# Patient Record
Sex: FEMALE | Race: WHITE | NOT HISPANIC OR LATINO | URBAN - METROPOLITAN AREA
[De-identification: names, ages, dates, MRNs, and addresses within clinical notes are randomized per-mention and may not be internally consistent; named-entity substitution may affect disease eponyms.]

---

## 2019-05-17 ENCOUNTER — INPATIENT (INPATIENT)
Facility: HOSPITAL | Age: 74
LOS: 11 days | Discharge: HOME | DRG: 481 | End: 2019-05-29
Attending: ORTHOPAEDIC SURGERY | Admitting: ORTHOPAEDIC SURGERY
Payer: COMMERCIAL

## 2019-05-17 VITALS
RESPIRATION RATE: 17 BRPM | OXYGEN SATURATION: 99 % | HEART RATE: 86 BPM | TEMPERATURE: 100 F | DIASTOLIC BLOOD PRESSURE: 105 MMHG | SYSTOLIC BLOOD PRESSURE: 173 MMHG

## 2019-05-17 DIAGNOSIS — M25.561 PAIN IN RIGHT KNEE: ICD-10-CM

## 2019-05-17 DIAGNOSIS — Z96.653 PRESENCE OF ARTIFICIAL KNEE JOINT, BILATERAL: Chronic | ICD-10-CM

## 2019-05-17 LAB
ALBUMIN SERPL ELPH-MCNC: 3.6 G/DL — SIGNIFICANT CHANGE UP (ref 3.3–5)
ALP SERPL-CCNC: 118 U/L — SIGNIFICANT CHANGE UP (ref 40–120)
ALT FLD-CCNC: 17 U/L — SIGNIFICANT CHANGE UP (ref 10–45)
ANION GAP SERPL CALC-SCNC: 11 MMOL/L — SIGNIFICANT CHANGE UP (ref 5–17)
APPEARANCE UR: CLEAR — SIGNIFICANT CHANGE UP
APTT BLD: 30.5 SEC — SIGNIFICANT CHANGE UP (ref 27.5–36.3)
AST SERPL-CCNC: 23 U/L — SIGNIFICANT CHANGE UP (ref 10–40)
BILIRUB SERPL-MCNC: 0.4 MG/DL — SIGNIFICANT CHANGE UP (ref 0.2–1.2)
BILIRUB UR-MCNC: NEGATIVE — SIGNIFICANT CHANGE UP
BLD GP AB SCN SERPL QL: NEGATIVE — SIGNIFICANT CHANGE UP
BUN SERPL-MCNC: 22 MG/DL — SIGNIFICANT CHANGE UP (ref 7–23)
CALCIUM SERPL-MCNC: 9.4 MG/DL — SIGNIFICANT CHANGE UP (ref 8.4–10.5)
CHLORIDE SERPL-SCNC: 106 MMOL/L — SIGNIFICANT CHANGE UP (ref 96–108)
CO2 SERPL-SCNC: 22 MMOL/L — SIGNIFICANT CHANGE UP (ref 22–31)
COLOR SPEC: YELLOW — SIGNIFICANT CHANGE UP
CREAT SERPL-MCNC: 0.64 MG/DL — SIGNIFICANT CHANGE UP (ref 0.5–1.3)
DIFF PNL FLD: NEGATIVE — SIGNIFICANT CHANGE UP
GLUCOSE SERPL-MCNC: 107 MG/DL — HIGH (ref 70–99)
GLUCOSE UR QL: NEGATIVE — SIGNIFICANT CHANGE UP
HCT VFR BLD CALC: 38.2 % — SIGNIFICANT CHANGE UP (ref 34.5–45)
HGB BLD-MCNC: 11.6 G/DL — SIGNIFICANT CHANGE UP (ref 11.5–15.5)
INR BLD: 0.96 — SIGNIFICANT CHANGE UP (ref 0.88–1.16)
KETONES UR-MCNC: ABNORMAL MG/DL
LEUKOCYTE ESTERASE UR-ACNC: NEGATIVE — SIGNIFICANT CHANGE UP
MCHC RBC-ENTMCNC: 25.3 PG — LOW (ref 27–34)
MCHC RBC-ENTMCNC: 30.4 GM/DL — LOW (ref 32–36)
MCV RBC AUTO: 83.2 FL — SIGNIFICANT CHANGE UP (ref 80–100)
NITRITE UR-MCNC: NEGATIVE — SIGNIFICANT CHANGE UP
NRBC # BLD: 0 /100 WBCS — SIGNIFICANT CHANGE UP (ref 0–0)
PH UR: 5.5 — SIGNIFICANT CHANGE UP (ref 5–8)
PLATELET # BLD AUTO: 223 K/UL — SIGNIFICANT CHANGE UP (ref 150–400)
POTASSIUM SERPL-MCNC: 4.6 MMOL/L — SIGNIFICANT CHANGE UP (ref 3.5–5.3)
POTASSIUM SERPL-SCNC: 4.6 MMOL/L — SIGNIFICANT CHANGE UP (ref 3.5–5.3)
PROT SERPL-MCNC: 6.8 G/DL — SIGNIFICANT CHANGE UP (ref 6–8.3)
PROT UR-MCNC: NEGATIVE MG/DL — SIGNIFICANT CHANGE UP
PROTHROM AB SERPL-ACNC: 10.8 SEC — SIGNIFICANT CHANGE UP (ref 10–12.9)
RBC # BLD: 4.59 M/UL — SIGNIFICANT CHANGE UP (ref 3.8–5.2)
RBC # FLD: 14.4 % — SIGNIFICANT CHANGE UP (ref 10.3–14.5)
RH IG SCN BLD-IMP: POSITIVE — SIGNIFICANT CHANGE UP
SODIUM SERPL-SCNC: 139 MMOL/L — SIGNIFICANT CHANGE UP (ref 135–145)
SP GR SPEC: 1.02 — SIGNIFICANT CHANGE UP (ref 1–1.03)
UROBILINOGEN FLD QL: 0.2 E.U./DL — SIGNIFICANT CHANGE UP
WBC # BLD: 5.49 K/UL — SIGNIFICANT CHANGE UP (ref 3.8–10.5)
WBC # FLD AUTO: 5.49 K/UL — SIGNIFICANT CHANGE UP (ref 3.8–10.5)

## 2019-05-17 PROCEDURE — 73700 CT LOWER EXTREMITY W/O DYE: CPT | Mod: 26,RT,76

## 2019-05-17 PROCEDURE — 99223 1ST HOSP IP/OBS HIGH 75: CPT

## 2019-05-17 PROCEDURE — 99223 1ST HOSP IP/OBS HIGH 75: CPT | Mod: GC

## 2019-05-17 PROCEDURE — 73552 X-RAY EXAM OF FEMUR 2/>: CPT | Mod: 26,RT

## 2019-05-17 PROCEDURE — 93010 ELECTROCARDIOGRAM REPORT: CPT | Mod: NC

## 2019-05-17 PROCEDURE — 71045 X-RAY EXAM CHEST 1 VIEW: CPT | Mod: 26

## 2019-05-17 PROCEDURE — 99285 EMERGENCY DEPT VISIT HI MDM: CPT | Mod: 25

## 2019-05-17 PROCEDURE — 73560 X-RAY EXAM OF KNEE 1 OR 2: CPT | Mod: 26,RT

## 2019-05-17 RX ORDER — HYDROMORPHONE HYDROCHLORIDE 2 MG/ML
0.5 INJECTION INTRAMUSCULAR; INTRAVENOUS; SUBCUTANEOUS EVERY 4 HOURS
Refills: 0 | Status: DISCONTINUED | OUTPATIENT
Start: 2019-05-17 | End: 2019-05-19

## 2019-05-17 RX ORDER — MAGNESIUM HYDROXIDE 400 MG/1
30 TABLET, CHEWABLE ORAL DAILY
Refills: 0 | Status: DISCONTINUED | OUTPATIENT
Start: 2019-05-17 | End: 2019-05-29

## 2019-05-17 RX ORDER — HEPARIN SODIUM 5000 [USP'U]/ML
5000 INJECTION INTRAVENOUS; SUBCUTANEOUS EVERY 8 HOURS
Refills: 0 | Status: DISCONTINUED | OUTPATIENT
Start: 2019-05-17 | End: 2019-05-18

## 2019-05-17 RX ORDER — OXYCODONE HYDROCHLORIDE 5 MG/1
5 TABLET ORAL EVERY 4 HOURS
Refills: 0 | Status: DISCONTINUED | OUTPATIENT
Start: 2019-05-17 | End: 2019-05-21

## 2019-05-17 RX ORDER — ACETAMINOPHEN 500 MG
1000 TABLET ORAL ONCE
Refills: 0 | Status: COMPLETED | OUTPATIENT
Start: 2019-05-17 | End: 2019-05-17

## 2019-05-17 RX ORDER — SODIUM CHLORIDE 9 MG/ML
1000 INJECTION, SOLUTION INTRAVENOUS
Refills: 0 | Status: DISCONTINUED | OUTPATIENT
Start: 2019-05-17 | End: 2019-05-18

## 2019-05-17 RX ORDER — POLYETHYLENE GLYCOL 3350 17 G/17G
17 POWDER, FOR SOLUTION ORAL DAILY
Refills: 0 | Status: DISCONTINUED | OUTPATIENT
Start: 2019-05-17 | End: 2019-05-29

## 2019-05-17 RX ORDER — OXYCODONE HYDROCHLORIDE 5 MG/1
10 TABLET ORAL EVERY 4 HOURS
Refills: 0 | Status: DISCONTINUED | OUTPATIENT
Start: 2019-05-17 | End: 2019-05-21

## 2019-05-17 RX ORDER — LANOLIN ALCOHOL/MO/W.PET/CERES
3 CREAM (GRAM) TOPICAL AT BEDTIME
Refills: 0 | Status: DISCONTINUED | OUTPATIENT
Start: 2019-05-17 | End: 2019-05-29

## 2019-05-17 RX ORDER — SENNA PLUS 8.6 MG/1
2 TABLET ORAL AT BEDTIME
Refills: 0 | Status: DISCONTINUED | OUTPATIENT
Start: 2019-05-17 | End: 2019-05-29

## 2019-05-17 RX ORDER — ACETAMINOPHEN 500 MG
650 TABLET ORAL EVERY 4 HOURS
Refills: 0 | Status: DISCONTINUED | OUTPATIENT
Start: 2019-05-17 | End: 2019-05-29

## 2019-05-17 RX ORDER — DOCUSATE SODIUM 100 MG
100 CAPSULE ORAL THREE TIMES A DAY
Refills: 0 | Status: DISCONTINUED | OUTPATIENT
Start: 2019-05-17 | End: 2019-05-29

## 2019-05-17 RX ORDER — ONDANSETRON 8 MG/1
4 TABLET, FILM COATED ORAL EVERY 6 HOURS
Refills: 0 | Status: DISCONTINUED | OUTPATIENT
Start: 2019-05-17 | End: 2019-05-29

## 2019-05-17 RX ADMIN — HEPARIN SODIUM 5000 UNIT(S): 5000 INJECTION INTRAVENOUS; SUBCUTANEOUS at 23:56

## 2019-05-17 RX ADMIN — Medication 400 MILLIGRAM(S): at 19:03

## 2019-05-17 RX ADMIN — SODIUM CHLORIDE 120 MILLILITER(S): 9 INJECTION, SOLUTION INTRAVENOUS at 23:55

## 2019-05-17 RX ADMIN — Medication 1000 MILLIGRAM(S): at 21:09

## 2019-05-17 RX ADMIN — Medication 3 MILLIGRAM(S): at 23:56

## 2019-05-17 NOTE — ED ADULT NURSE REASSESSMENT NOTE - NS ED NURSE REASSESS COMMENT FT1
17F Osullivan catheter inserted. Pt denies any pain or discomfort during and after insertion. Pt tolerated procedure. Noted clear yellow urine draining in drainage bag ~ 200mL output. Will continue to monitor.

## 2019-05-17 NOTE — H&P ADULT - NSHPSOCIALHISTORY_GEN_ALL_CORE
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 Denies cigs, EtOH and illicit drug use

## 2019-05-17 NOTE — ED PROVIDER NOTE - PHYSICAL EXAMINATION
VITAL SIGNS: I have reviewed nursing notes and confirm.  CONSTITUTIONAL: Well-developed; well-nourished; in minimal distress.  SKIN: Skin is warm and dry, no acute rash.  HEAD: Normocephalic; atraumatic.  EYES:  EOM intact; conjunctiva and sclera clear.  ENT: No nasal discharge; airway clear.  NECK: Supple; Voluntary FROM  CARD: No rubs appreciated, Regular rate and rhythm.  RESP: No wheezes, no rales. No respiratory distress  ABD: Soft; non-distended; non-tender; no rebound or guarding  EXT: Normal ROM. No cyanosis or edema.  RLE: neurovascularly intact, no ttp at ankle/hip, knee ttp, may ROM, externally rotated.   NEURO: Alert, oriented. Grossly unremarkable.  PSYCH: Cooperative, appropriate.

## 2019-05-17 NOTE — ED PROVIDER NOTE - CLINICAL SUMMARY MEDICAL DECISION MAKING FREE TEXT BOX
73F pmh htn (not on meds), s/p juan luis and gastric bypass, prior K knee sx p/w slip & fall down 2 stairs w R knee pain & immobility. No prior episodes. Unable to bear weight. No popping/tearing. Exam neurovascular intact. Differential diagnosis includes but is not limited to fracture, dislocation,  contusion, MSK etiology. 73F pmh htn (not on meds), s/p juan luis and gastric bypass, prior K knee sx p/w slip & fall down 2 stairs w R knee pain & immobility. No prior episodes. Unable to bear weight. No popping/tearing. Exam neurovascular intact. Differential diagnosis includes but is not limited to fracture, dislocation,  contusion, MSK etiology. Comminuted R distal femur fx. Pt states no pain w/o movement, advised findings, need for sx. 73F pmh htn (not on meds), s/p juan luis and gastric bypass, prior K knee sx p/w slip & fall down 2 stairs w R knee pain & immobility. No prior episodes. Unable to bear weight. No popping/tearing. Exam neurovascular intact. Differential diagnosis includes but is not limited to fracture, dislocation,  contusion, MSK etiology. Comminuted R distal femur fx. Pt states no pain w/o movement, advised findings, need for sx. CT requested by ortho for op planning.

## 2019-05-17 NOTE — CONSULT NOTE ADULT - SUBJECTIVE AND OBJECTIVE BOX
HPI: 73F PMH HTN (no longer on medications, well-controlled), h/o gastric bypass surgery 15 yrs ago, b/l knee replacement (5 yrs ago for R knee and 1 yr ago for L knee) presenting after a fall, admitted for R distal femur periprosthetic fx repair. 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 This AM, pt had another fall and hit her R knee but had difficulty mobilizing her legs. Denies f/c, weight changes, cp, sob, abdominal pain, n/v/d/c, dysuria, edema, rash.    Pt is able to walk 4-6 miles in a day without any dyspnea. Pt last had a pharmacological stress test 5 yrs ago that was negative. No MI or cardiac events in the past.      PAST MEDICAL/SURGICAL HISTORY  PAST MEDICAL & SURGICAL HISTORY:  Mild hypertension  S/P total knee arthroplasty, bilateral  gastric bypass  appendectomy 1971  cholecystectomy 50 yrs ago    FAMILY HISTORY:  Father - HLD, endarterectomy, knee replacement  Mother - arthritis, thyroid disease    ALLERGY:  sulfa drugs - fever and malaise    MEDICATIONS:  melatonin  fish oil  CoQ 10  psyllium  biotin  Vitamins s/p gastric bypass    T(C): 37.4 (05-17-19 @ 19:46), Max: 37.8 (05-17-19 @ 16:46)  HR: 84 (05-17-19 @ 19:46) (84 - 86)  BP: 106/65 (05-17-19 @ 19:46) (106/65 - 173/105)  RR: 17 (05-17-19 @ 19:46) (17 - 17)  SpO2: 98% (05-17-19 @ 19:46) (98% - 99%)  Wt(kg): --Vital Signs Last 24 Hrs  T(C): 37.4 (17 May 2019 19:46), Max: 37.8 (17 May 2019 16:46)  T(F): 99.4 (17 May 2019 19:46), Max: 100.1 (17 May 2019 16:46)  HR: 84 (17 May 2019 19:46) (84 - 86)  BP: 106/65 (17 May 2019 19:46) (106/65 - 173/105)  BP(mean): --  RR: 17 (17 May 2019 19:46) (17 - 17)  SpO2: 98% (17 May 2019 19:46) (98% - 99%)    PHYSICAL EXAM:  GENERAL: pleasant female, obese, in NAD  HEAD:  Atraumatic, Normocephalic  EYES: EOMI, PERRLA, conjunctiva and sclera clear  ENMT: No tonsillar erythema, exudates, or enlargement; Moist mucous membranes, Good dentition, No lesions  NECK: Supple  NERVOUS SYSTEM:  Alert & Oriented X3, Good concentration  CHEST/LUNG: Clear to ascultation bilaterally; No rales, rhonchi, wheezing, or rubs  HEART: Regular rate and rhythm; No murmurs, rubs, or gallops  ABDOMEN: Soft, Nontender, Nondistended; Bowel sounds present  EXTREMITIES:  2+ Peripheral Pulses, No clubbing, cyanosis. R knee tender to palpation with swollen warm joint; nonerythematous  LYMPH: No lymphadenopathy noted  SKIN: No rashes or lesions    Consultant(s) Notes Reviewed:  [x ] YES  [ ] NO  Care Discussed with Consultants/Other Providers [ ] YES  [ ] NO    LABS:  CBC   05-17-19 @ 17:44  Hematocrit 38.2  Hemoglobin 11.6  Mean Cell Hemoglobin 25.3  Platelet Count-Automated 223  RBC Count 4.59  Red Cell Distrib Width 14.4  Wbc Count 5.49      BMP  05-17-19 @ 17:44  Anion Gap. Serum 11  Blood Urea Nitrogen,Serm 22  Calcium, Total Serum 9.4  Carbon Dioxide, Serum 22  Chloride, Serum 106  Creatinine, Serum 0.64  eGFR in  103  eGFR in Non Afican American 89  Gloucose, serum 107  Potassium, Serum 4.6  Sodium, Serum 139                  CMP  05-17-19 @ 17:44  Jie Aminotransferase(ALT/SGPT)17  Albumin, Serum 3.6  Alkaline Phosphatase, Serum 118  Anion Gap, Serum 11  Aspartate Aminotransferase (AST/SGOT)23  Bilirubin Total, Serum 0.4  Blood Urea Nitrogen, Serum 22  Calcium,Total Serum 9.4  Carbon Dioxide, Serum 22  Chloride, Serum 106  Creatinine, Serum 0.64  eGFR if  103  eGFR if Non African American 89  Glucose, Serum 107  Potassium, Serum 4.6  Protein Total, Serum 6.8  Sodium, Serum 139                          PT/INR  PT/INR  05-17-19 @ 17:44  INR 0.96  Prothrombin Time Comment --  Prothrobin Time, Meaqgy03.8                  RADIOLOGY & ADDITIONAL TESTS: none

## 2019-05-17 NOTE — H&P ADULT - NSHPPHYSICALEXAM_GEN_ALL_CORE
Gen: Obese, awake, alert and oriented, NAD  MSK:   R knee with well-healed scar, bruise but no open wounds or erythema  TTP around knee  Sensation intact s/s/sp/dp/t   firing EHL/FHL/GS/TA  pulses 2+ DP and PT  LUCRETIA R 1.09 and L 1  toes warm and well perfused  + log roll

## 2019-05-17 NOTE — H&P ADULT - HISTORY OF PRESENT ILLNESS
73F hx bilateral TKA at [facility] and previous hx HTN. Was on losartan but no longer on it, presenting to ED s/p mechanical fall down 2 steps onto her right knee with right knee pain. Small knee bruise but no open wounds or erythema. No other injuries. No head trauma. No LOC. Previous fall a day prior to same knee but did not seek medical care. Not on anticoagulation. Ambulates about 6 miles daily without assistive devices. Denies fever, chills, nausea, vomiting, SOB, numbness or tingling.

## 2019-05-17 NOTE — ED PROVIDER NOTE - OBJECTIVE STATEMENT
73F pmh htn (not on meds), s/p juan luis and gastric bypass, prior K knee sx p/w slip & fall down 2 stairs w R knee pain & immobility. No prior episodes. Unable to bear weight. No popping/tearing.    no prior CAD, stents

## 2019-05-17 NOTE — CONSULT NOTE ADULT - PROBLEM SELECTOR PROBLEM 4
Obesity, unspecified classification, unspecified obesity type, unspecified whether serious comorbidity present

## 2019-05-17 NOTE — H&P ADULT - ASSESSMENT
73F hx bilateral TKA s/p mechanical fall with R comminuted distal femur periprosthetic fracture      - admit to Ortho  - placed in KI  - NWB RLE  - zayas  - DVT: SQH, SCDs  - preop clearance   - booked and consented   - preop labs  - OR tomorrow ORIF v retrograde nail

## 2019-05-17 NOTE — H&P ADULT - NSHPLABSRESULTS_GEN_ALL_CORE
CT prelim:   FINDINGS: Patient is status post bilateral knee arthroplasties. There is an   acute comminuted fracture of the distal femoral shaft. The distal fracture   fragment is posteriorly displaced. Artifact from the arthroplasty hardware   limits detailed evaluation but there is soft tissue swelling of the   surrounding area. Tibial hardware is intact.     IMPRESSION:   Acute comminuted fracture of the distal femoral shaft. CT prelim:   FINDINGS: Patient is status post bilateral knee arthroplasties. There is an   acute comminuted fracture of the distal femoral shaft. The distal fracture   fragment is posteriorly displaced. Artifact from the arthroplasty hardware   limits detailed evaluation but there is soft tissue swelling of the   surrounding area. Tibial hardware is intact.     IMPRESSION:   Acute comminuted fracture of the distal femur. No evidence of prosthesis loosening

## 2019-05-17 NOTE — H&P ADULT - ATTENDING COMMENTS
Patient seen examined and history reviewed with patient, family, and resident in morning of Saturday May 18th. Subsequently, I discussed the patients care with the patients primary joints orthopedic surgeon, Dr. Isaac Segura at [facility] ([place]). We discussed the patients plan for retrograde intramedullary nailing and the size of the components were confirmed to ensure appropriate instrumentation available for the case. The patient has a stable appearing  right total knee arthroplasty with an LPS nexgen Breann total knee with associated distal femur periarticular fracture. She has been cleared by medicine. I explained at length to the patient and the family the risks, benefits and alternatives to operative treatment. Depending on the stability of the construct and her bone quality will determine her weight bearing post operatively. In addition, we discussed her eventual transition back to [place] and need for anticoagulation. She will likely be placed on eliquis 2.5mg BID for the trip home. She will be following up with Dr. Segura which we will continue to coordinate post operatively.   The hardware for the case has been called in. If it arrives in time, we will proceed to surgery today (5/18), otherwise we will plan for first thing 5/19.  Over 1 hour was spent coordinating and discussing this patients case.

## 2019-05-17 NOTE — ED ADULT NURSE NOTE - OBJECTIVE STATEMENT
Pt is a 74 y/o female who came in to ED for evaluation of right leg pain s/p fall today. Pt reports leg is externally rotated. Pt c/o pain only with movement.

## 2019-05-17 NOTE — CONSULT NOTE ADULT - ASSESSMENT
73F PMH HTN (no longer on medications, well-controlled), h/o gastric bypass surgery 15 yrs ago, b/l knee replacement (5 yrs ago for R knee and 1 yr ago for L knee) presenting after a fall, admitted for R distal femur periprosthetic fx repair.     RCRI risk score 0  METS > 4  EKG NSR with ?TWI V6    Pt is low risk for an intermediate risk procedure  She is medically optimized for femur fx repair tomorrow    INCOMPLETE 73F PMH HTN (no longer on medications, well-controlled), h/o gastric bypass surgery 15 yrs ago, b/l knee replacement (5 yrs ago for R knee and 1 yr ago for L knee) presenting after a fall, admitted for R distal femur periprosthetic fx repair.     RCRI risk score 0  METS > 4  EKG NSR with ?TWI V6    Pt is low risk for an intermediate risk procedure  She is medically optimized for femur fx repair tomorrow    Discussed with attending Dr. Blanchard

## 2019-05-17 NOTE — CONSULT NOTE ADULT - ATTENDING COMMENTS
patient seen and examined; reviewed labs, radiology imaging, EKG, above consult note    PE findings as above      1. R femur fx/ preop: medically optimized , no further testing, monitor BP jacqui and post op; medicine will follow         rest of plan as above

## 2019-05-18 DIAGNOSIS — S72.444A: ICD-10-CM

## 2019-05-18 DIAGNOSIS — I10 ESSENTIAL (PRIMARY) HYPERTENSION: ICD-10-CM

## 2019-05-18 DIAGNOSIS — E66.9 OBESITY, UNSPECIFIED: ICD-10-CM

## 2019-05-18 DIAGNOSIS — Z01.818 ENCOUNTER FOR OTHER PREPROCEDURAL EXAMINATION: ICD-10-CM

## 2019-05-18 LAB
ANION GAP SERPL CALC-SCNC: 10 MMOL/L — SIGNIFICANT CHANGE UP (ref 5–17)
BUN SERPL-MCNC: 21 MG/DL — SIGNIFICANT CHANGE UP (ref 7–23)
CALCIUM SERPL-MCNC: 9 MG/DL — SIGNIFICANT CHANGE UP (ref 8.4–10.5)
CHLORIDE SERPL-SCNC: 109 MMOL/L — HIGH (ref 96–108)
CO2 SERPL-SCNC: 24 MMOL/L — SIGNIFICANT CHANGE UP (ref 22–31)
CREAT SERPL-MCNC: 0.83 MG/DL — SIGNIFICANT CHANGE UP (ref 0.5–1.3)
GLUCOSE SERPL-MCNC: 123 MG/DL — HIGH (ref 70–99)
HCT VFR BLD CALC: 31 % — LOW (ref 34.5–45)
HCV AB S/CO SERPL IA: 0.14 S/CO — SIGNIFICANT CHANGE UP
HCV AB SERPL-IMP: SIGNIFICANT CHANGE UP
HGB BLD-MCNC: 9.6 G/DL — LOW (ref 11.5–15.5)
MCHC RBC-ENTMCNC: 25.7 PG — LOW (ref 27–34)
MCHC RBC-ENTMCNC: 31 GM/DL — LOW (ref 32–36)
MCV RBC AUTO: 82.9 FL — SIGNIFICANT CHANGE UP (ref 80–100)
MRSA PCR RESULT.: NEGATIVE — SIGNIFICANT CHANGE UP
NRBC # BLD: 0 /100 WBCS — SIGNIFICANT CHANGE UP (ref 0–0)
PLATELET # BLD AUTO: 197 K/UL — SIGNIFICANT CHANGE UP (ref 150–400)
POTASSIUM SERPL-MCNC: 4.8 MMOL/L — SIGNIFICANT CHANGE UP (ref 3.5–5.3)
POTASSIUM SERPL-SCNC: 4.8 MMOL/L — SIGNIFICANT CHANGE UP (ref 3.5–5.3)
RBC # BLD: 3.74 M/UL — LOW (ref 3.8–5.2)
RBC # FLD: 14.5 % — SIGNIFICANT CHANGE UP (ref 10.3–14.5)
S AUREUS DNA NOSE QL NAA+PROBE: POSITIVE
SODIUM SERPL-SCNC: 143 MMOL/L — SIGNIFICANT CHANGE UP (ref 135–145)
WBC # BLD: 11.83 K/UL — HIGH (ref 3.8–10.5)
WBC # FLD AUTO: 11.83 K/UL — HIGH (ref 3.8–10.5)

## 2019-05-18 PROCEDURE — 99233 SBSQ HOSP IP/OBS HIGH 50: CPT | Mod: GC

## 2019-05-18 RX ORDER — HEPARIN SODIUM 5000 [USP'U]/ML
5000 INJECTION INTRAVENOUS; SUBCUTANEOUS ONCE
Refills: 0 | Status: COMPLETED | OUTPATIENT
Start: 2019-05-18 | End: 2019-05-18

## 2019-05-18 RX ORDER — HEPARIN SODIUM 5000 [USP'U]/ML
7500 INJECTION INTRAVENOUS; SUBCUTANEOUS EVERY 8 HOURS
Refills: 0 | Status: DISCONTINUED | OUTPATIENT
Start: 2019-05-18 | End: 2019-05-19

## 2019-05-18 RX ORDER — SODIUM CHLORIDE 9 MG/ML
1000 INJECTION, SOLUTION INTRAVENOUS
Refills: 0 | Status: DISCONTINUED | OUTPATIENT
Start: 2019-05-18 | End: 2019-05-29

## 2019-05-18 RX ADMIN — HEPARIN SODIUM 5000 UNIT(S): 5000 INJECTION INTRAVENOUS; SUBCUTANEOUS at 14:30

## 2019-05-18 RX ADMIN — HEPARIN SODIUM 7500 UNIT(S): 5000 INJECTION INTRAVENOUS; SUBCUTANEOUS at 21:48

## 2019-05-18 RX ADMIN — OXYCODONE HYDROCHLORIDE 10 MILLIGRAM(S): 5 TABLET ORAL at 16:11

## 2019-05-18 RX ADMIN — Medication 650 MILLIGRAM(S): at 18:00

## 2019-05-18 RX ADMIN — Medication 650 MILLIGRAM(S): at 07:26

## 2019-05-18 RX ADMIN — Medication 650 MILLIGRAM(S): at 17:09

## 2019-05-18 RX ADMIN — Medication 650 MILLIGRAM(S): at 08:26

## 2019-05-18 RX ADMIN — Medication 3 MILLIGRAM(S): at 21:47

## 2019-05-18 RX ADMIN — OXYCODONE HYDROCHLORIDE 10 MILLIGRAM(S): 5 TABLET ORAL at 17:28

## 2019-05-18 NOTE — PRE-OP CHECKLIST - SELECT TESTS ORDERED
Type and Cross/EKG/CXR/Urinalysis/Type and Screen EKG/CXR/Urinalysis/BMP/CMP/PT/PTT/CBC/Type and Cross/Type and Screen/INR

## 2019-05-18 NOTE — PROGRESS NOTE ADULT - SUBJECTIVE AND OBJECTIVE BOX
SUBJECTIVE: Patient seen and examined. Pain controlled.  Pt did well o/n  No f/c/n/v/cp/sob.     OBJECTIVE:  NAD  Vital Signs Last 24 Hrs  T(C): 37 (18 May 2019 04:20), Max: 37.8 (17 May 2019 16:46)  T(F): 98.6 (18 May 2019 04:20), Max: 100.1 (17 May 2019 16:46)  HR: 65 (18 May 2019 04:20) (65 - 86)  BP: 100/56 (18 May 2019 04:20) (100/56 - 173/105)  BP(mean): --  RR: 16 (18 May 2019 04:20) (16 - 17)  SpO2: 96% (18 May 2019 04:20) (96% - 99%)    Affected extremity:          In KI, ecchymosis, swelling about the knee         Sensation: SILT         Motor exam: 5/5 TA/GS/EHL         warm well perfused; capillary refill <3 seconds                         9.6    11.83 )-----------( 197      ( 18 May 2019 06:45 )             31.0     05-18    143  |  109<H>  |  21  ----------------------------<  123<H>  4.8   |  24  |  0.83    Ca    9.0      18 May 2019 06:45    TPro  6.8  /  Alb  3.6  /  TBili  0.4  /  DBili  x   /  AST  23  /  ALT  17  /  AlkPhos  118  05-17    A/P :  Pt is a 72yo Female s/p fall with R periprosthetic distal femur fx  -    NPO  -    Pain control  -    DVT ppx: SCD     -    Weight bearing status: NWB   -    Dispo: OR     Juan Laguna MD  PGY -4  Orthopaedic Surgery

## 2019-05-18 NOTE — PROGRESS NOTE ADULT - SUBJECTIVE AND OBJECTIVE BOX
MEDICINE CONSULT PROGRESS NOTE  INTERVAL HPI/OVERNIGHT EVENTS:    OVERNIGHT: No overnight events.  SUBJECTIVE: Patient seen and examined at bedside. Denies pain currently at rest. Currently anxious about going to the OR.    ROS:  CV: Denies chest pain  Resp: Denies SOB  GI: Denies abdominal pain, constipation, diarrhea, nausea, vomiting  : Denies dysuria  ID: Denies fevers, chills  MSK: Denies joint pain     OBJECTIVE:    VITAL SIGNS:  ICU Vital Signs Last 24 Hrs  T(C): 36.7 (18 May 2019 08:10), Max: 37.8 (17 May 2019 16:46)  T(F): 98 (18 May 2019 08:10), Max: 100.1 (17 May 2019 16:46)  HR: 60 (18 May 2019 08:10) (60 - 86)  BP: 110/55 (18 May 2019 08:10) (100/56 - 173/105)  RR: 15 (18 May 2019 08:10) (15 - 17)  SpO2: 98% (18 May 2019 08:10) (96% - 99%)     @ 07:01  -   @ 07:00  --------------------------------------------------------  IN: 0 mL / OUT: 275 mL / NET: -275 mL        PHYSICAL EXAM:    General: NAD, comfortable  HEENT: NCAT, PERRL, clear conjunctiva, no scleral icterus  Neck: supple, no JVD  Respiratory: CTA b/l, no wheezing, rhonchi, rales  Cardiovascular: RRR, normal S1S2, no M/R/G  Abdomen: soft, NT/ND, bowel sounds in all four quadrants, no palpable masses  Extremities: No clubbing, cyanosis noted. R knee joint is warm and swollen, tender to palpation  Vascular:  2+ peripheral pulses  Neuro: AOx3    MEDICATIONS:  MEDICATIONS  (STANDING):  docusate sodium 100 milliGRAM(s) Oral three times a day  heparin  Injectable 5000 Unit(s) SubCutaneous every 8 hours  lactated ringers. 1000 milliLiter(s) (100 mL/Hr) IV Continuous <Continuous>  melatonin 3 milliGRAM(s) Oral at bedtime  polyethylene glycol 3350 17 Gram(s) Oral daily    MEDICATIONS  (PRN):  acetaminophen   Tablet .. 650 milliGRAM(s) Oral every 4 hours PRN Temp greater or equal to 38C (100.4F), Mild Pain (1 - 3)  aluminum hydroxide/magnesium hydroxide/simethicone Suspension 30 milliLiter(s) Oral four times a day PRN Indigestion  HYDROmorphone  Injectable 0.5 milliGRAM(s) IV Push every 4 hours PRN Breakthrough pain  magnesium hydroxide Suspension 30 milliLiter(s) Oral daily PRN Constipation  ondansetron Injectable 4 milliGRAM(s) IV Push every 6 hours PRN Nausea and/or Vomiting  oxyCODONE    IR 5 milliGRAM(s) Oral every 4 hours PRN Moderate Pain (4 - 6)  oxyCODONE    IR 10 milliGRAM(s) Oral every 4 hours PRN Severe Pain (7 - 10)  senna 2 Tablet(s) Oral at bedtime PRN Constipation      ALLERGIES:  sulfa drugs (Unknown)      LABS:                        9.6    11.83 )-----------( 197      ( 18 May 2019 06:45 )             31.0         143  |  109<H>  |  21  ----------------------------<  123<H>  4.8   |  24  |  0.83    Ca    9.0      18 May 2019 06:45    TPro  6.8  /  Alb  3.6  /  TBili  0.4  /  DBili  x   /  AST  23  /  ALT  17  /  AlkPhos  118      PT/INR - ( 17 May 2019 17:44 )   PT: 10.8 sec;   INR: 0.96          PTT - ( 17 May 2019 17:44 )  PTT:30.5 sec  Urinalysis Basic - ( 17 May 2019 19:47 )    Color: Yellow / Appearance: Clear / S.025 / pH: x  Gluc: x / Ketone: Trace mg/dL  / Bili: Negative / Urobili: 0.2 E.U./dL   Blood: x / Protein: NEGATIVE mg/dL / Nitrite: NEGATIVE   Leuk Esterase: NEGATIVE / RBC: x / WBC x   Sq Epi: x / Non Sq Epi: x / Bacteria: x      RADIOLOGY & ADDITIONAL TESTS:   < from: CT Knee No Cont, Right (05.17.19 @ 21:20) >  IMPRESSION:  Acute comminuted fracture of the distal femoral shaft.      < end of copied text >

## 2019-05-18 NOTE — PROGRESS NOTE ADULT - ASSESSMENT
73F PMH HTN (no longer on medications, well-controlled), h/o gastric bypass surgery 15 yrs ago, b/l knee replacement (5 yrs ago for R knee and 1 yr ago for L knee) presenting after a fall, admitted for R distal femur periprosthetic fx repair.     1. Preoperative risk assessment.  RCRI risk score 0  METS > 4  EKG NSR with ?TWI V6    Pt is low risk for an intermediate risk procedure  She is medically optimized for femur fx repair.    Will continue to follow.

## 2019-05-19 LAB
ANION GAP SERPL CALC-SCNC: 11 MMOL/L — SIGNIFICANT CHANGE UP (ref 5–17)
BUN SERPL-MCNC: 12 MG/DL — SIGNIFICANT CHANGE UP (ref 7–23)
CALCIUM SERPL-MCNC: 8.6 MG/DL — SIGNIFICANT CHANGE UP (ref 8.4–10.5)
CHLORIDE SERPL-SCNC: 106 MMOL/L — SIGNIFICANT CHANGE UP (ref 96–108)
CO2 SERPL-SCNC: 24 MMOL/L — SIGNIFICANT CHANGE UP (ref 22–31)
CREAT SERPL-MCNC: 0.81 MG/DL — SIGNIFICANT CHANGE UP (ref 0.5–1.3)
CULTURE RESULTS: NO GROWTH — SIGNIFICANT CHANGE UP
GLUCOSE SERPL-MCNC: 146 MG/DL — HIGH (ref 70–99)
HCT VFR BLD CALC: 27.2 % — LOW (ref 34.5–45)
HGB BLD-MCNC: 8.5 G/DL — LOW (ref 11.5–15.5)
MCHC RBC-ENTMCNC: 26.2 PG — LOW (ref 27–34)
MCHC RBC-ENTMCNC: 31.3 GM/DL — LOW (ref 32–36)
MCV RBC AUTO: 83.7 FL — SIGNIFICANT CHANGE UP (ref 80–100)
NRBC # BLD: 0 /100 WBCS — SIGNIFICANT CHANGE UP (ref 0–0)
PLATELET # BLD AUTO: 171 K/UL — SIGNIFICANT CHANGE UP (ref 150–400)
POTASSIUM SERPL-MCNC: 4.8 MMOL/L — SIGNIFICANT CHANGE UP (ref 3.5–5.3)
POTASSIUM SERPL-SCNC: 4.8 MMOL/L — SIGNIFICANT CHANGE UP (ref 3.5–5.3)
RBC # BLD: 3.25 M/UL — LOW (ref 3.8–5.2)
RBC # FLD: 14.3 % — SIGNIFICANT CHANGE UP (ref 10.3–14.5)
SODIUM SERPL-SCNC: 141 MMOL/L — SIGNIFICANT CHANGE UP (ref 135–145)
SPECIMEN SOURCE: SIGNIFICANT CHANGE UP
WBC # BLD: 13.24 K/UL — HIGH (ref 3.8–10.5)
WBC # FLD AUTO: 13.24 K/UL — HIGH (ref 3.8–10.5)

## 2019-05-19 PROCEDURE — 77071 MNL APPL STRS JT RADIOGRAPHY: CPT

## 2019-05-19 PROCEDURE — 27506 TREATMENT OF THIGH FRACTURE: CPT | Mod: RT

## 2019-05-19 PROCEDURE — 73560 X-RAY EXAM OF KNEE 1 OR 2: CPT | Mod: 26,RT

## 2019-05-19 PROCEDURE — 73551 X-RAY EXAM OF FEMUR 1: CPT | Mod: 26,RT

## 2019-05-19 PROCEDURE — 27486 REVISE/REPLACE KNEE JOINT: CPT | Mod: 52,RT

## 2019-05-19 RX ORDER — CEFAZOLIN SODIUM 1 G
3000 VIAL (EA) INJECTION EVERY 8 HOURS
Refills: 0 | Status: DISCONTINUED | OUTPATIENT
Start: 2019-05-19 | End: 2019-05-19

## 2019-05-19 RX ORDER — CEFAZOLIN SODIUM 1 G
2000 VIAL (EA) INJECTION EVERY 8 HOURS
Refills: 0 | Status: COMPLETED | OUTPATIENT
Start: 2019-05-19 | End: 2019-05-20

## 2019-05-19 RX ORDER — MORPHINE SULFATE 50 MG/1
4 CAPSULE, EXTENDED RELEASE ORAL EVERY 4 HOURS
Refills: 0 | Status: DISCONTINUED | OUTPATIENT
Start: 2019-05-19 | End: 2019-05-19

## 2019-05-19 RX ORDER — HEPARIN SODIUM 5000 [USP'U]/ML
7500 INJECTION INTRAVENOUS; SUBCUTANEOUS EVERY 8 HOURS
Refills: 0 | Status: DISCONTINUED | OUTPATIENT
Start: 2019-05-19 | End: 2019-05-19

## 2019-05-19 RX ORDER — BUPIVACAINE 13.3 MG/ML
20 INJECTION, SUSPENSION, LIPOSOMAL INFILTRATION ONCE
Refills: 0 | Status: DISCONTINUED | OUTPATIENT
Start: 2019-05-19 | End: 2019-05-29

## 2019-05-19 RX ORDER — MORPHINE SULFATE 50 MG/1
4 CAPSULE, EXTENDED RELEASE ORAL
Refills: 0 | Status: DISCONTINUED | OUTPATIENT
Start: 2019-05-19 | End: 2019-05-20

## 2019-05-19 RX ORDER — ENOXAPARIN SODIUM 100 MG/ML
40 INJECTION SUBCUTANEOUS EVERY 12 HOURS
Refills: 0 | Status: DISCONTINUED | OUTPATIENT
Start: 2019-05-19 | End: 2019-05-20

## 2019-05-19 RX ADMIN — ENOXAPARIN SODIUM 40 MILLIGRAM(S): 100 INJECTION SUBCUTANEOUS at 21:13

## 2019-05-19 RX ADMIN — Medication 2000 MILLIGRAM(S): at 17:21

## 2019-05-19 RX ADMIN — Medication 100 MILLIGRAM(S): at 21:13

## 2019-05-19 RX ADMIN — Medication 3 MILLIGRAM(S): at 21:13

## 2019-05-19 NOTE — PROGRESS NOTE ADULT - SUBJECTIVE AND OBJECTIVE BOX
9.6    11.83 )-----------( 197      ( 18 May 2019 06:45 )             31.0   SUBJECTIVE: Patient seen and examined. Pain controlled.  Pt did well o/n  No f/c/n/v/cp/sob.     OBJECTIVE:  NAD  Vital Signs Last 24 Hrs  T(C): 36.2 (19 May 2019 06:23), Max: 37.9 (18 May 2019 16:34)  T(F): 97.1 (19 May 2019 06:23), Max: 100.2 (18 May 2019 16:34)  HR: 75 (19 May 2019 06:23) (68 - 75)  BP: 144/65 (19 May 2019 06:23) (129/68 - 144/65)  BP(mean): --  RR: 16 (19 May 2019 06:23) (16 - 16)  SpO2: 97% (19 May 2019 06:23) (97% - 97%)    Affected extremity:          In KI, ecchymosis, swelling about the knee         Sensation: SILT         Motor exam: 5/5 TA/GS/EHL         warm well perfused; capillary refill <3 seconds                                         9.6    11.83 )-----------( 197      ( 18 May 2019 06:45 )             31.0       A/P :  Pt is a 72yo Female s/p fall with R periprosthetic distal femur fx  -    NPO  -    Pain control  -    DVT ppx: SCD     -    Weight bearing status: NWB   -    Dispo: OR today    Juan Laguna MD  PGY -4  Orthopaedic Surgery

## 2019-05-19 NOTE — PROGRESS NOTE ADULT - SUBJECTIVE AND OBJECTIVE BOX
Ortho Post Op Check    Procedure: R femur retrograde IMN with ply exchange   Surgeon: Rizwan    Pt comfortable without complaints, pain controlled  Denies CP, SOB, N/V, numbness/tingling     Vital Signs Last 24 Hrs  T(C): 36.7 (05-20-19 @ 08:20), Max: 37.8 (05-20-19 @ 05:00)  T(F): 98.1 (05-20-19 @ 08:20), Max: 100.1 (05-20-19 @ 05:00)  HR: 91 (05-20-19 @ 08:20) (77 - 91)  BP: 92/57 (05-20-19 @ 08:20) (92/57 - 106/64)  BP(mean): --  RR: 17 (05-20-19 @ 08:20) (17 - 17)  SpO2: 95% (05-20-19 @ 08:20) (95% - 95%)    General: Pt Alert and oriented, NAD  RLE dressing DSG C/D/I, bledose brace in place in extension   Pulses: 2+ DP  Sensation intact distally  Motor: EHL/FHL/TA/GS 4/5                          6.9    8.56  )-----------( 168      ( 20 May 2019 08:34 )             21.8   20 May 2019 06:57    139    |  104    |  11     ----------------------------<  128    4.1     |  26     |  0.72     Ca    8.2        20 May 2019 06:57        Post-op X-Ray: Hardware in place with good alignment     A/P: 73yFemale POD#0 s/p R femur IMN and ply exchange   - Stable  - Pain Control  - DVT ppx: SCDS  - Post op abx: ancef periop  - PT, WBS: TTWB  - f/u AM labs  - dispo planning     Ortho Pager [phone]

## 2019-05-19 NOTE — BRIEF OPERATIVE NOTE - NSICDXBRIEFPOSTOP_GEN_ALL_CORE_FT
POST-OP DIAGNOSIS:  Periprosthetic supracondylar fracture of femur 19-May-2019 17:23:08  Juan Laguna

## 2019-05-20 LAB
ANION GAP SERPL CALC-SCNC: 9 MMOL/L — SIGNIFICANT CHANGE UP (ref 5–17)
BUN SERPL-MCNC: 11 MG/DL — SIGNIFICANT CHANGE UP (ref 7–23)
CALCIUM SERPL-MCNC: 8.2 MG/DL — LOW (ref 8.4–10.5)
CHLORIDE SERPL-SCNC: 104 MMOL/L — SIGNIFICANT CHANGE UP (ref 96–108)
CO2 SERPL-SCNC: 26 MMOL/L — SIGNIFICANT CHANGE UP (ref 22–31)
CREAT SERPL-MCNC: 0.72 MG/DL — SIGNIFICANT CHANGE UP (ref 0.5–1.3)
GLUCOSE SERPL-MCNC: 128 MG/DL — HIGH (ref 70–99)
HCT VFR BLD CALC: 20.9 % — CRITICAL LOW (ref 34.5–45)
HCT VFR BLD CALC: 21.8 % — LOW (ref 34.5–45)
HCT VFR BLD CALC: 23.8 % — LOW (ref 34.5–45)
HGB BLD-MCNC: 6.6 G/DL — CRITICAL LOW (ref 11.5–15.5)
HGB BLD-MCNC: 6.9 G/DL — CRITICAL LOW (ref 11.5–15.5)
HGB BLD-MCNC: 7.6 G/DL — LOW (ref 11.5–15.5)
MCHC RBC-ENTMCNC: 26 PG — LOW (ref 27–34)
MCHC RBC-ENTMCNC: 26.1 PG — LOW (ref 27–34)
MCHC RBC-ENTMCNC: 26.5 PG — LOW (ref 27–34)
MCHC RBC-ENTMCNC: 31.6 GM/DL — LOW (ref 32–36)
MCHC RBC-ENTMCNC: 31.7 GM/DL — LOW (ref 32–36)
MCHC RBC-ENTMCNC: 31.9 GM/DL — LOW (ref 32–36)
MCV RBC AUTO: 82.3 FL — SIGNIFICANT CHANGE UP (ref 80–100)
MCV RBC AUTO: 82.6 FL — SIGNIFICANT CHANGE UP (ref 80–100)
MCV RBC AUTO: 82.9 FL — SIGNIFICANT CHANGE UP (ref 80–100)
NRBC # BLD: 0 /100 WBCS — SIGNIFICANT CHANGE UP (ref 0–0)
PLATELET # BLD AUTO: 162 K/UL — SIGNIFICANT CHANGE UP (ref 150–400)
PLATELET # BLD AUTO: 168 K/UL — SIGNIFICANT CHANGE UP (ref 150–400)
PLATELET # BLD AUTO: 182 K/UL — SIGNIFICANT CHANGE UP (ref 150–400)
POTASSIUM SERPL-MCNC: 4.1 MMOL/L — SIGNIFICANT CHANGE UP (ref 3.5–5.3)
POTASSIUM SERPL-SCNC: 4.1 MMOL/L — SIGNIFICANT CHANGE UP (ref 3.5–5.3)
RBC # BLD: 2.53 M/UL — LOW (ref 3.8–5.2)
RBC # BLD: 2.65 M/UL — LOW (ref 3.8–5.2)
RBC # BLD: 2.87 M/UL — LOW (ref 3.8–5.2)
RBC # FLD: 14.1 % — SIGNIFICANT CHANGE UP (ref 10.3–14.5)
RBC # FLD: 14.3 % — SIGNIFICANT CHANGE UP (ref 10.3–14.5)
RBC # FLD: 14.6 % — HIGH (ref 10.3–14.5)
SODIUM SERPL-SCNC: 139 MMOL/L — SIGNIFICANT CHANGE UP (ref 135–145)
WBC # BLD: 8.24 K/UL — SIGNIFICANT CHANGE UP (ref 3.8–10.5)
WBC # BLD: 8.56 K/UL — SIGNIFICANT CHANGE UP (ref 3.8–10.5)
WBC # BLD: 8.7 K/UL — SIGNIFICANT CHANGE UP (ref 3.8–10.5)
WBC # FLD AUTO: 8.24 K/UL — SIGNIFICANT CHANGE UP (ref 3.8–10.5)
WBC # FLD AUTO: 8.56 K/UL — SIGNIFICANT CHANGE UP (ref 3.8–10.5)
WBC # FLD AUTO: 8.7 K/UL — SIGNIFICANT CHANGE UP (ref 3.8–10.5)

## 2019-05-20 PROCEDURE — 99233 SBSQ HOSP IP/OBS HIGH 50: CPT

## 2019-05-20 RX ORDER — ENOXAPARIN SODIUM 100 MG/ML
40 INJECTION SUBCUTANEOUS DAILY
Refills: 0 | Status: DISCONTINUED | OUTPATIENT
Start: 2019-05-21 | End: 2019-05-21

## 2019-05-20 RX ADMIN — Medication 650 MILLIGRAM(S): at 05:03

## 2019-05-20 RX ADMIN — ENOXAPARIN SODIUM 40 MILLIGRAM(S): 100 INJECTION SUBCUTANEOUS at 09:55

## 2019-05-20 RX ADMIN — Medication 2000 MILLIGRAM(S): at 02:08

## 2019-05-20 RX ADMIN — Medication 3 MILLIGRAM(S): at 21:52

## 2019-05-20 RX ADMIN — Medication 650 MILLIGRAM(S): at 15:15

## 2019-05-20 RX ADMIN — Medication 100 MILLIGRAM(S): at 05:03

## 2019-05-20 RX ADMIN — Medication 650 MILLIGRAM(S): at 06:03

## 2019-05-20 RX ADMIN — Medication 650 MILLIGRAM(S): at 14:15

## 2019-05-20 NOTE — PROGRESS NOTE ADULT - SUBJECTIVE AND OBJECTIVE BOX
POST OPERATIVE DAY #  SUBJECTIVE: Patient seen and examined.  Pt without complaints.   Denies chest pain/SOB/dizziness/n/v/HA   Pain well controlled.       OBJECTIVE:     Vital Signs Last 24 Hrs  T(C): 37.6 (20 May 2019 13:40), Max: 37.8 (20 May 2019 05:00)  T(F): 99.6 (20 May 2019 13:40), Max: 100.1 (20 May 2019 05:00)  HR: 78 (20 May 2019 13:40) (62 - 91)  BP: 98/61 (20 May 2019 13:40) (92/57 - 135/58)  BP(mean): 70 (19 May 2019 15:59) (62 - 83)  RR: 17 (20 May 2019 13:40) (14 - 24)  SpO2: 95% (20 May 2019 13:40) (95% - 100%)    PE:          Dressing: clean/dry/intact, brace in place          Sensation: intact to light touch to patient's baseline distally          Motor exam:  firing ehl/ta/gs/fhl          Skin warm, well-perfused; capillary refill brisk             LABS:                        6.9    8.56  )-----------( 168      ( 20 May 2019 08:34 )             21.8     05-20    139  |  104  |  11  ----------------------------<  128<H>  4.1   |  26  |  0.72    Ca    8.2<L>      20 May 2019 06:57              ASSESSMENT AND PLAN: POD 1 right femur retrograde nail doing well   1. Brace in place, 0-30 degrees in bed, locked in extension while TTWB otherwise.   2. Analgesic pain control  3. DVT prophylaxis: Lovenox 40 daily, discussed with Dr. Meléndez, SCDs   4. Weight Bearing Status:  TTWB RLE   5. Disposition:  Pending PT, see below  6. However PT unable to work with pt this AM due to post op symptomatic anemia with Hgb 6.6. Pt consented for and currently undergoing transfusion. Will follow up CBC after 1 unit

## 2019-05-20 NOTE — PROGRESS NOTE ADULT - ASSESSMENT
73F PMH HTN, h/o gastric bypass surgery 15 yrs ago, b/l knee replacement, presenting after a fall, admitted for R distal femur periprosthetic fx repair. Medicine consulted for medical clearance. Now POD-1 s/p R retrograde femoral nail, poly exchange.    1) Post-op state  * OOB-C  * Physical therapy evaluation  * Aggressive incentive spirometry  * Pain control and bowel regimen  * Mechanical LE ppx   * Please remove zayas catheter    2) HTN, controlled.   off bp meds.     3) Post-op anemia, expected/   * Transfuse for Hb>7g    4) VTE ppx.   * Change Lovenox to 40 daily (instead of bid). Patient can remain on Lovenox ppx until day after her flight.     Will follow

## 2019-05-20 NOTE — PROGRESS NOTE ADULT - SUBJECTIVE AND OBJECTIVE BOX
SUBJECTIVE: Patient seen and examined. Pain controlled.  Pt did well o/n  No f/c/n/v/cp/sob.     OBJECTIVE:  NAD  Vital Signs Last 24 Hrs  T(C): 37.8 (20 May 2019 05:00), Max: 37.8 (20 May 2019 05:00)  T(F): 100.1 (20 May 2019 05:00), Max: 100.1 (20 May 2019 05:00)  HR: 77 (20 May 2019 05:00) (62 - 82)  BP: 106/64 (20 May 2019 05:00) (93/60 - 135/58)  BP(mean): 70 (19 May 2019 15:59) (62 - 83)  RR: 17 (20 May 2019 05:00) (14 - 24)  SpO2: 95% (20 May 2019 05:00) (95% - 100%)    Affected extremity:          In knee brace, ace bandage c/d/i         Sensation: SILT         Motor exam: 5/5 TA/GS/EHL         warm well perfused; capillary refill <3 seconds                                6.6    8.24  )-----------( 162      ( 20 May 2019 06:57 )             20.9     05-20    139  |  104  |  11  ----------------------------<  128<H>  4.1   |  26  |  0.72    Ca    8.2<L>      20 May 2019 06:57          A/P :  Pt is a 72yo Female s/p R retrograde femoral nail, poly exchange 5/19  -    Pain control  -    DVT ppx: SCD, LVX 40bid     -    Weight bearing status: TTWB, ROMAT limit 0-30 in bed, locked in extension when ambulating with PT   -   consult medicine for travel recs and dvt ppx   -    Dispo: Home pending PT juwan Laguna MD  PGY -4  Orthopaedic Surgery

## 2019-05-20 NOTE — PROGRESS NOTE ADULT - SUBJECTIVE AND OBJECTIVE BOX
CC: Feeling well. No complaints  Denies cp, sob, dizziness.   Rest of ROS negative.     Vital Signs Last 24 Hrs  T(C): 37 (20 May 2019 10:45), Max: 37.8 (20 May 2019 05:00)  T(F): 98.6 (20 May 2019 10:45), Max: 100.1 (20 May 2019 05:00)  HR: 78 (20 May 2019 10:45) (62 - 91)  BP: 105/68 (20 May 2019 10:45) (92/57 - 135/58)  BP(mean): 70 (19 May 2019 15:59) (62 - 83)  RR: 17 (20 May 2019 10:45) (14 - 24)  SpO2: 97% (20 May 2019 10:45) (95% - 100%)    PHYSICAL EXAMINATION  * General: Not in acute distress. Awake and alert. Lying comfortably in bed.  * Head: Normocephalic, atraumatic.  * HEENT: ears no discharge, eyes PERRLA, nose no discharge, throat no exudates, normal tonsils.  * Neck: no JVD, supple.  * Lungs: Clear to auscultation, no rales, no wheezes.  * Cardio: Regular rate and rhythm, no murmurs, no rubs, no gallops. Good peripheral pulses.  * Abdomen: Soft, non-tender, non-distended, tympanic to percussion, no rebound, no guarding, no rigidity. Bowel sounds present. No suprapubic or CVA tenderness.  * : Osullivan  * Extremities: Acyanotic, no edema.  * Skin: Warm and dry.  * Neuro: Alert and oriented x 3. No focal deficits. Motor strength is 5/5 throughout. Sensation intact. Cranial nerves II-XII grossly intact.                           6.9    8.56  )-----------( 168      ( 20 May 2019 08:34 )             21.8   05-20    139  |  104  |  11  ----------------------------<  128<H>  4.1   |  26  |  0.72    Ca    8.2<L>      20 May 2019 06:57    MEDICATIONS  (STANDING):  BUpivacaine liposome 1.3% Injectable (no eMAR) 20 milliLiter(s) Local Injection once  docusate sodium 100 milliGRAM(s) Oral three times a day  enoxaparin Injectable 40 milliGRAM(s) SubCutaneous every 12 hours  lactated ringers. 1000 milliLiter(s) (100 mL/Hr) IV Continuous <Continuous>  melatonin 3 milliGRAM(s) Oral at bedtime  polyethylene glycol 3350 17 Gram(s) Oral daily    MEDICATIONS  (PRN):  acetaminophen   Tablet .. 650 milliGRAM(s) Oral every 4 hours PRN Temp greater or equal to 38C (100.4F), Mild Pain (1 - 3)  aluminum hydroxide/magnesium hydroxide/simethicone Suspension 30 milliLiter(s) Oral four times a day PRN Indigestion  bisacodyl Suppository 10 milliGRAM(s) Rectal daily PRN If no bowel movement by postoperative day #2  magnesium hydroxide Suspension 30 milliLiter(s) Oral daily PRN Constipation  morphine  - Injectable 4 milliGRAM(s) IV Push every 4 hours PRN breakthrough floor  ondansetron Injectable 4 milliGRAM(s) IV Push every 6 hours PRN Nausea and/or Vomiting  oxyCODONE    IR 5 milliGRAM(s) Oral every 4 hours PRN Moderate Pain (4 - 6)  oxyCODONE    IR 10 milliGRAM(s) Oral every 4 hours PRN Severe Pain (7 - 10)  senna 2 Tablet(s) Oral at bedtime PRN Constipation

## 2019-05-20 NOTE — PROGRESS NOTE ADULT - ASSESSMENT
Patient seen and examined by me this evening at 8pm. Patient doing well, pain controlled while in bed. Tolerated blood transfusion for symptomatic anemia. I had a long discussion with the patient and her  regarding the patients care. As expected the patient has post traumatic anemai from her comminuted femur fracture. No evidence of ongoing acute blood loss anemia. The patient may require further transfusion. We will monitor her for orthostasis and repeat blood work  Patient otherwise doing excellent.   PT recommended acute rehab which we will have reassessed as the patient continues to normalize in her recovery. Depending on the patients progress over the next couple days, the patient will likely change her flight to return to [place] rather than going to a physcial therapy or rehab facility prior to flying back to [facility]. She will likely require rehab back in [place] and I will talk to her Primary joints (Dr. Isaac Segura) surgeon about best options tomorrow.     Continue DVT ppx. Plan to convert to eloquis prior to flight home  TTWB RLE   OOB with PT   f/u 5/21 labs  dressing change tomorrow evening with Dr. Astorga

## 2019-05-20 NOTE — PROVIDER CONTACT NOTE (CRITICAL VALUE NOTIFICATION) - ASSESSMENT
VSS   /64  HR - 77   RR- 17  Temp 100.1  95% O2 sat
A&Ox4. Pt 92/57. Rest of VS WNL. Pt denies sob, chest pain, dizziness, headache, nausea, vomiting, and lightheadedness. Pt states, "I feel fine". No s/s of anemia. Dressing to right leg is dry, clean, and intact.
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
## 2019-05-21 LAB
ANION GAP SERPL CALC-SCNC: 7 MMOL/L — SIGNIFICANT CHANGE UP (ref 5–17)
BUN SERPL-MCNC: 7 MG/DL — SIGNIFICANT CHANGE UP (ref 7–23)
CALCIUM SERPL-MCNC: 8.2 MG/DL — LOW (ref 8.4–10.5)
CHLORIDE SERPL-SCNC: 106 MMOL/L — SIGNIFICANT CHANGE UP (ref 96–108)
CO2 SERPL-SCNC: 29 MMOL/L — SIGNIFICANT CHANGE UP (ref 22–31)
CREAT SERPL-MCNC: 0.54 MG/DL — SIGNIFICANT CHANGE UP (ref 0.5–1.3)
GLUCOSE SERPL-MCNC: 115 MG/DL — HIGH (ref 70–99)
HCT VFR BLD CALC: 21.3 % — LOW (ref 34.5–45)
HCT VFR BLD CALC: 28 % — LOW (ref 34.5–45)
HGB BLD-MCNC: 6.8 G/DL — CRITICAL LOW (ref 11.5–15.5)
HGB BLD-MCNC: 9 G/DL — LOW (ref 11.5–15.5)
MCHC RBC-ENTMCNC: 26.6 PG — LOW (ref 27–34)
MCHC RBC-ENTMCNC: 26.8 PG — LOW (ref 27–34)
MCHC RBC-ENTMCNC: 31.9 GM/DL — LOW (ref 32–36)
MCHC RBC-ENTMCNC: 32.1 GM/DL — SIGNIFICANT CHANGE UP (ref 32–36)
MCV RBC AUTO: 83.2 FL — SIGNIFICANT CHANGE UP (ref 80–100)
MCV RBC AUTO: 83.3 FL — SIGNIFICANT CHANGE UP (ref 80–100)
NRBC # BLD: 0 /100 WBCS — SIGNIFICANT CHANGE UP (ref 0–0)
NRBC # BLD: 0 /100 WBCS — SIGNIFICANT CHANGE UP (ref 0–0)
PLATELET # BLD AUTO: 170 K/UL — SIGNIFICANT CHANGE UP (ref 150–400)
PLATELET # BLD AUTO: 210 K/UL — SIGNIFICANT CHANGE UP (ref 150–400)
POTASSIUM SERPL-MCNC: 3.5 MMOL/L — SIGNIFICANT CHANGE UP (ref 3.5–5.3)
POTASSIUM SERPL-SCNC: 3.5 MMOL/L — SIGNIFICANT CHANGE UP (ref 3.5–5.3)
RBC # BLD: 2.56 M/UL — LOW (ref 3.8–5.2)
RBC # BLD: 3.36 M/UL — LOW (ref 3.8–5.2)
RBC # FLD: 13.9 % — SIGNIFICANT CHANGE UP (ref 10.3–14.5)
RBC # FLD: 14.2 % — SIGNIFICANT CHANGE UP (ref 10.3–14.5)
SODIUM SERPL-SCNC: 142 MMOL/L — SIGNIFICANT CHANGE UP (ref 135–145)
WBC # BLD: 6.88 K/UL — SIGNIFICANT CHANGE UP (ref 3.8–10.5)
WBC # BLD: 9.61 K/UL — SIGNIFICANT CHANGE UP (ref 3.8–10.5)
WBC # FLD AUTO: 6.88 K/UL — SIGNIFICANT CHANGE UP (ref 3.8–10.5)
WBC # FLD AUTO: 9.61 K/UL — SIGNIFICANT CHANGE UP (ref 3.8–10.5)

## 2019-05-21 PROCEDURE — 99233 SBSQ HOSP IP/OBS HIGH 50: CPT

## 2019-05-21 RX ORDER — TRAMADOL HYDROCHLORIDE 50 MG/1
25 TABLET ORAL EVERY 4 HOURS
Refills: 0 | Status: DISCONTINUED | OUTPATIENT
Start: 2019-05-21 | End: 2019-05-28

## 2019-05-21 RX ORDER — HEPARIN SODIUM 5000 [USP'U]/ML
7500 INJECTION INTRAVENOUS; SUBCUTANEOUS EVERY 8 HOURS
Refills: 0 | Status: DISCONTINUED | OUTPATIENT
Start: 2019-05-21 | End: 2019-05-23

## 2019-05-21 RX ORDER — TRAMADOL HYDROCHLORIDE 50 MG/1
50 TABLET ORAL EVERY 4 HOURS
Refills: 0 | Status: DISCONTINUED | OUTPATIENT
Start: 2019-05-21 | End: 2019-05-26

## 2019-05-21 RX ADMIN — TRAMADOL HYDROCHLORIDE 50 MILLIGRAM(S): 50 TABLET ORAL at 12:02

## 2019-05-21 RX ADMIN — ENOXAPARIN SODIUM 40 MILLIGRAM(S): 100 INJECTION SUBCUTANEOUS at 05:53

## 2019-05-21 RX ADMIN — TRAMADOL HYDROCHLORIDE 50 MILLIGRAM(S): 50 TABLET ORAL at 13:02

## 2019-05-21 RX ADMIN — HEPARIN SODIUM 7500 UNIT(S): 5000 INJECTION INTRAVENOUS; SUBCUTANEOUS at 21:48

## 2019-05-21 RX ADMIN — Medication 3 MILLIGRAM(S): at 21:49

## 2019-05-21 NOTE — PROVIDER CONTACT NOTE (CRITICAL VALUE NOTIFICATION) - SITUATION
Patient post op R ORIF on the 17th.
Patient post op R ORIF
low H/H; hemoglobin 6.9 & hematocrit 21.8

## 2019-05-21 NOTE — PROVIDER CONTACT NOTE (CRITICAL VALUE NOTIFICATION) - ACTION/TREATMENT ORDERED:
1 unit of PRBCs ordered and to be given. Monitor patient for s/s of anemia/bleeding.
Notified team.
Ortho team made aware

## 2019-05-21 NOTE — DIETITIAN INITIAL EVALUATION ADULT. - OTHER INFO
74 yo/female with PMHx B/L TKA, HTN, presented s/p mechanical fall onto R. knee. Found to have R. distal femur periprosthetic fracture. S/p R. retrograde femoral IMN on 5/19. Pt seen in room, awake, alert, very pleasant. She endorses very good appetite PTA; usually cooks at home, no dietary restrictions. Currently w/fair appetite- decreased from baseline 2/2 immobility. No N/V/C/D reported at this time. +BM 5/20. NKFA. No difficulty chewing or swallowing. Skin noted with surgical wound. Pain well controlled at this time. General, healthy PO intake nutrition edu provided; pt understanding and receptive.

## 2019-05-21 NOTE — DIETITIAN INITIAL EVALUATION ADULT. - ENERGY NEEDS
Height 62"; #; #; 191%IBW  BMI 38.4  Ideal body weight used for calculations as pt >120% of IBW. Needs estimated for maintenance in older adults; increased protein for post-op/wound healing

## 2019-05-21 NOTE — PROGRESS NOTE ADULT - SUBJECTIVE AND OBJECTIVE BOX
POST OPERATIVE DAY # 2 right femur retrograde IM nail   SUBJECTIVE: Patient seen and examined.  Pt without complaints. Feeling well today  Denies chest pain/SOB/dizziness/n/v/HA   Pain well controlled.       OBJECTIVE:     Vital Signs Last 24 Hrs  T(C): 37.7 (21 May 2019 09:50), Max: 37.7 (21 May 2019 09:50)  T(F): 99.8 (21 May 2019 09:50), Max: 99.8 (21 May 2019 09:50)  HR: 71 (21 May 2019 09:50) (71 - 84)  BP: 105/66 (21 May 2019 09:50) (98/61 - 112/56)  BP(mean): --  RR: 17 (21 May 2019 09:50) (16 - 17)  SpO2: 97% (21 May 2019 09:50) (95% - 98%)    PE:          Dressing: clean/dry/intact, brace in place.         Sensation: intact to light touch to patient's baseline         Motor exam:  firing ehl/ta/gs/fhl          Skin warm, well-perfused; capillary refill brisk             LABS:                        6.8    6.88  )-----------( 170      ( 21 May 2019 06:31 )             21.3     05-21    142  |  106  |  7   ----------------------------<  115<H>  3.5   |  29  |  0.54    Ca    8.2<L>      21 May 2019 06:31      ASSESSMENT AND PLAN: POD 2 right femur retrograde IMN  1. Stable. Transfused 1unit pRBC this AM for acute symptomatic post op anemia with hgb 6.8. Will follow up post transfusion CBC.   2. Analgesic pain control  3. DVT prophylaxis: Lovenox   4. Weight Bearing Status:  TTWB RLE in brace, Locked in extension with ambulation; 0-30 ROM in bed for now   5. Disposition:  TOI, discussed with K[facility]   6. Plan discussed with Dr. Astorga

## 2019-05-21 NOTE — PROGRESS NOTE ADULT - SUBJECTIVE AND OBJECTIVE BOX
CC: Feeling well. No complaints  Denies cp, sob, dizziness.   Rest of ROS negative.     Vital Signs Last 24 Hrs  T(C): 37.7 (21 May 2019 09:50), Max: 37.7 (21 May 2019 09:50)  T(F): 99.8 (21 May 2019 09:50), Max: 99.8 (21 May 2019 09:50)  HR: 71 (21 May 2019 09:50) (71 - 84)  BP: 105/66 (21 May 2019 09:50) (105/64 - 112/56)  BP(mean): --  RR: 17 (21 May 2019 09:50) (16 - 17)  SpO2: 97% (21 May 2019 09:50) (96% - 98%)    PHYSICAL EXAMINATION  * General: Not in acute distress. Awake and alert. Lying comfortably in bed.  * Head: Normocephalic, atraumatic.  * HEENT: ears no discharge, eyes PERRLA, nose no discharge, throat no exudates, normal tonsils.  * Neck: no JVD, supple.  * Lungs: Clear to auscultation, no rales, no wheezes.  * Cardio: Regular rate and rhythm, no murmurs, no rubs, no gallops. Good peripheral pulses.  * Abdomen: Soft, non-tender, non-distended, tympanic to percussion, no rebound, no guarding, no rigidity. Bowel sounds present. No suprapubic or CVA tenderness.  * : Osullivan  * Extremities: Acyanotic, no edema.  * Skin: Warm and dry.  * Neuro: Alert and oriented x 3. No focal deficits. Motor strength is 5/5 throughout. Sensation intact. Cranial nerves II-XII grossly intact.                           9.0    9.61  )-----------( 210      ( 21 May 2019 12:47 )             28.0   05-21    142  |  106  |  7   ----------------------------<  115<H>  3.5   |  29  |  0.54    Ca    8.2<L>      21 May 2019 06:31    MEDICATIONS  (STANDING):  BUpivacaine liposome 1.3% Injectable (no eMAR) 20 milliLiter(s) Local Injection once  docusate sodium 100 milliGRAM(s) Oral three times a day  enoxaparin Injectable 40 milliGRAM(s) SubCutaneous daily  lactated ringers. 1000 milliLiter(s) (100 mL/Hr) IV Continuous <Continuous>  melatonin 3 milliGRAM(s) Oral at bedtime  polyethylene glycol 3350 17 Gram(s) Oral daily    MEDICATIONS  (PRN):  acetaminophen   Tablet .. 650 milliGRAM(s) Oral every 4 hours PRN Temp greater or equal to 38C (100.4F), Mild Pain (1 - 3)  aluminum hydroxide/magnesium hydroxide/simethicone Suspension 30 milliLiter(s) Oral four times a day PRN Indigestion  bisacodyl Suppository 10 milliGRAM(s) Rectal daily PRN If no bowel movement by postoperative day #2  magnesium hydroxide Suspension 30 milliLiter(s) Oral daily PRN Constipation  morphine  - Injectable 4 milliGRAM(s) IV Push every 4 hours PRN breakthrough floor  ondansetron Injectable 4 milliGRAM(s) IV Push every 6 hours PRN Nausea and/or Vomiting  senna 2 Tablet(s) Oral at bedtime PRN Constipation  traMADol 25 milliGRAM(s) Oral every 4 hours PRN Moderate Pain (4 - 6)  traMADol 50 milliGRAM(s) Oral every 4 hours PRN Severe Pain (7 - 10)

## 2019-05-21 NOTE — PROGRESS NOTE ADULT - ASSESSMENT
(Delayed Entry)     Patient seen and examined this evening at 8:30 PM 5/21  Resting comfortably in bed with  at bedside. She is realizing and coming to terms with the nature and severity of her injury. . The patient is pleased with her care. Her pain continues to improve.     Right lower extremity  Post op dressings taken down and changed. Staples intact. Incisions C/D/I. superficial serous blisters about medial distal thigh. No evidence of infection.   Medipore dressings applied   No evidence of drainage or erythema  Hinge knee brace locked in extension  Skin: There were no lesions. There is no erythema.  Vascular: The patient has a warm and well-perfused foot. There is a palpable 2+ dorsalis pedis and posterior tibialis artery. There is brisk capillary refill to all 5 digits.  Sensation intact to light touch in saphenous, sural, superficial peroneal, deep peroneal, and tibial nerve distributions.   Motors: Patient is able to fire extensor hallucis longus, flexor hallucis longus, tibialis anterior, gastrocsoleus complex. The patient has 5 out of 5 strength in all myotomes.    Assessment and plan:  73-year-old female with right distal femur periprosthetic femur fracture POD #2 with decreased hgb requiring second transfusion today which significantly improved orthostatic hypotension. There is no evidence of active bleeding. I had a lengthy discussion with the patient regarding her duration and prognosis of recovery. The patient resides in [place] and has a flight back to [place] on May 29. Physical therapy he continues to recommend subacute rehabilitation. I am concerned about getting the patient back to her native home where she has the appropriate social support structure to optimize her recovery. Disposition must be coordinated if the patient is to be discharged prior to flight back to [place]. The patient was able a sit up in bed for 3 hours today which was a significant improvement  #1 out of bed with physical therapy. TTWB RLE  #2 followup a.m. labs. Monitor for decreased hemoglobin and orthostasis  #3 okay to unocked knee brace to 0°-30° while in bed. Encouraged range of motion while in bed  #4 ice and elevate  #5 continue SCDs  #6 pain control

## 2019-05-21 NOTE — PROVIDER CONTACT NOTE (CRITICAL VALUE NOTIFICATION) - BACKGROUND
Blood transfusion 5/20
history of anemia
s/p right distal femur ORIF with polyexchange after fall, POD#1

## 2019-05-21 NOTE — PROGRESS NOTE ADULT - ASSESSMENT
73F PMH HTN, h/o gastric bypass surgery 15 yrs ago, b/l knee replacement, presenting after a fall, admitted for R distal femur periprosthetic fx repair. Medicine consulted for medical clearance. Now POD-2 s/p R retrograde femoral nail, poly exchange.    1) Post-op state  * OOB-C  * Physical therapy evaluation  * Aggressive incentive spirometry  * Pain control and bowel regimen  * Mechanical LE ppx     2) HTN, controlled.   off bp meds.     3) Post-op anemia, expected  * Transfused another 1u prbc (total 2 since OR)  * Transfuse for Hb>7g  * post-op cbc    4) VTE ppx.   * Change Lovenox to HSQ, since patient is for TOI now.   * SCDs

## 2019-05-21 NOTE — PROGRESS NOTE ADULT - SUBJECTIVE AND OBJECTIVE BOX
SUBJECTIVE: Patient seen and examined. Pain controlled.  Pt did well o/n  No f/c/n/v/cp/sob.     OBJECTIVE:  NAD  Vital Signs Last 24 Hrs  T(C): 37.2 (21 May 2019 05:05), Max: 37.6 (20 May 2019 13:40)  T(F): 98.9 (21 May 2019 05:05), Max: 99.6 (20 May 2019 13:40)  HR: 84 (21 May 2019 05:05) (76 - 84)  BP: 112/56 (21 May 2019 05:05) (98/61 - 112/56)  BP(mean): --  RR: 16 (21 May 2019 05:05) (16 - 17)  SpO2: 96% (21 May 2019 05:05) (95% - 99%)    Affected extremity:          Dressing: clean/dry/intact , hinged knee brace          Sensation: SILT         Motor exam: 5/5 TA/GS/EHL         warm well perfused; capillary refill <3 seconds                         6.8    6.88  )-----------( 170      ( 21 May 2019 06:31 )             21.3     05-21    142  |  106  |  7   ----------------------------<  115<H>  3.5   |  29  |  0.54    Ca    8.2<L>      21 May 2019 06:31      A/P :  Pt is a 74yo Female s/p R retrograde femoral nail, poly exchange 5/19  -    Pain control  -    DVT ppx: SCD, LVX 40 qd  -    Weight bearing status: TTWB, ROMAT limit 0-30 in bed, locked in extension when ambulating with PT   -   appreciate med recs  -    Dispo: TOI Laguna MD  PGY -4  Orthopaedic Surgery

## 2019-05-22 LAB
ANION GAP SERPL CALC-SCNC: 8 MMOL/L — SIGNIFICANT CHANGE UP (ref 5–17)
BUN SERPL-MCNC: 7 MG/DL — SIGNIFICANT CHANGE UP (ref 7–23)
CALCIUM SERPL-MCNC: 8.2 MG/DL — LOW (ref 8.4–10.5)
CHLORIDE SERPL-SCNC: 104 MMOL/L — SIGNIFICANT CHANGE UP (ref 96–108)
CO2 SERPL-SCNC: 30 MMOL/L — SIGNIFICANT CHANGE UP (ref 22–31)
CREAT SERPL-MCNC: 0.56 MG/DL — SIGNIFICANT CHANGE UP (ref 0.5–1.3)
GLUCOSE SERPL-MCNC: 111 MG/DL — HIGH (ref 70–99)
HCT VFR BLD CALC: 23.6 % — LOW (ref 34.5–45)
HGB BLD-MCNC: 7.5 G/DL — LOW (ref 11.5–15.5)
MCHC RBC-ENTMCNC: 26.9 PG — LOW (ref 27–34)
MCHC RBC-ENTMCNC: 31.8 GM/DL — LOW (ref 32–36)
MCV RBC AUTO: 84.6 FL — SIGNIFICANT CHANGE UP (ref 80–100)
NRBC # BLD: 0 /100 WBCS — SIGNIFICANT CHANGE UP (ref 0–0)
PLATELET # BLD AUTO: 196 K/UL — SIGNIFICANT CHANGE UP (ref 150–400)
POTASSIUM SERPL-MCNC: 3.5 MMOL/L — SIGNIFICANT CHANGE UP (ref 3.5–5.3)
POTASSIUM SERPL-SCNC: 3.5 MMOL/L — SIGNIFICANT CHANGE UP (ref 3.5–5.3)
RBC # BLD: 2.79 M/UL — LOW (ref 3.8–5.2)
RBC # FLD: 14 % — SIGNIFICANT CHANGE UP (ref 10.3–14.5)
SODIUM SERPL-SCNC: 142 MMOL/L — SIGNIFICANT CHANGE UP (ref 135–145)
VIT D25+D1,25 OH+D1,25 PNL SERPL-MCNC: 70.3 PG/ML — SIGNIFICANT CHANGE UP (ref 19.9–79.3)
WBC # BLD: 7.39 K/UL — SIGNIFICANT CHANGE UP (ref 3.8–10.5)
WBC # FLD AUTO: 7.39 K/UL — SIGNIFICANT CHANGE UP (ref 3.8–10.5)

## 2019-05-22 PROCEDURE — 99233 SBSQ HOSP IP/OBS HIGH 50: CPT

## 2019-05-22 RX ADMIN — HEPARIN SODIUM 7500 UNIT(S): 5000 INJECTION INTRAVENOUS; SUBCUTANEOUS at 05:02

## 2019-05-22 RX ADMIN — TRAMADOL HYDROCHLORIDE 25 MILLIGRAM(S): 50 TABLET ORAL at 13:28

## 2019-05-22 RX ADMIN — HEPARIN SODIUM 7500 UNIT(S): 5000 INJECTION INTRAVENOUS; SUBCUTANEOUS at 13:27

## 2019-05-22 RX ADMIN — HEPARIN SODIUM 7500 UNIT(S): 5000 INJECTION INTRAVENOUS; SUBCUTANEOUS at 21:05

## 2019-05-22 RX ADMIN — Medication 3 MILLIGRAM(S): at 21:05

## 2019-05-22 RX ADMIN — TRAMADOL HYDROCHLORIDE 25 MILLIGRAM(S): 50 TABLET ORAL at 14:28

## 2019-05-22 RX ADMIN — Medication 100 MILLIGRAM(S): at 13:31

## 2019-05-22 NOTE — PROGRESS NOTE ADULT - ASSESSMENT
Patient seen and examined this evening at 8 PM  Resting comfortably in bed with improving affect. The patient is pleased with her progress. Her pain continues to improve. She was able to get up with physical therapy but is struggling with the toe-touch weightbearing.    Right lower extremity  Dressings were changed yesterday remained clean dry and intact.  No evidence of drainage or erythema  Hinge knee brace locked in extension  Skin: There were no lesions. There is no erythema.  Vascular: The patient has a warm and well-perfused foot. There is a palpable 2+ dorsalis pedis and posterior tibialis artery. There is brisk capillary refill to all 5 digits.  Sensation intact to light touch in saphenous, sural, superficial peroneal, deep peroneal, and tibial nerve distributions.   Motors: Patient is able to fire extensor hallucis longus, flexor hallucis longus, tibialis anterior, gastrocsoleus complex. The patient has 5 out of 5 strength in all myotomes.    Assessment and plan:  73-year-old female with right distal femur periprosthetic femur fracture who was stable today clinically without any evidence of orthostatic hypotension. Her hemoglobin did drop from 9-7.5. There is no evidence of active bleeding. I had a lengthy discussion with the patient regarding her clinical disposition. The patient resides in [place] and has a flight back to [place] on May 29. Physical therapy he continues to recommend subacute rehabilitation. I am concerned about getting the patient back to her native home where she has the appropriate social support structure to optimize her recovery. Disposition must be coordinated if the patient is to be discharged prior to flight back to [place]. The patient was able a sit up in bed for 3 hours today which was a significant improvement  #1 out of bed with physical therapy. Patient okay to advance to partial weightbearing with his knee brace locked in extension  #2 followup a.m. labs. Monitor for decreased hemoglobin and orthostasis  #3 okay to unocked knee brace to 0°-30° while in bed. Encouraged range of motion while in bed  #4 plan to transition the patient eloquis 2.5 mg p.o. b.i.d. once hemoglobin and vitals have stabilized to minimize DVT risk for cross-country flight  #5 ice and elevate  #6 continue SCDs

## 2019-05-22 NOTE — PROGRESS NOTE ADULT - SUBJECTIVE AND OBJECTIVE BOX
POST OPERATIVE DAY # 3 right femur retrograde IM nail  SUBJECTIVE: Patient seen and examined.  Pt without complaints. Feeling well.   Denies chest pain/SOB/dizziness/n/v/HA   Pain well controlled.       OBJECTIVE:     Vital Signs Last 24 Hrs  T(C): 37.1 (22 May 2019 08:31), Max: 37.3 (22 May 2019 04:35)  T(F): 98.8 (22 May 2019 08:31), Max: 99.1 (22 May 2019 04:35)  HR: 74 (22 May 2019 08:31) (74 - 80)  BP: 99/63 (22 May 2019 08:31) (99/63 - 116/71)  BP(mean): --  RR: 17 (22 May 2019 08:31) (16 - 17)  SpO2: 95% (22 May 2019 08:31) (95% - 96%)    PE:  Sitting up in bed, eating breakfast.          Dressing: clean/dry/intact. Brace in place.          Sensation: intact to light touch to patient's baseline distally         Motor exam:  firing ehl/ta/fhl/gs c/w prior exams.          Skin warm, well-perfused; capillary refill brisk             LABS:                        7.5    7.39  )-----------( 196      ( 22 May 2019 07:09 )             23.6     05-22    142  |  104  |  7   ----------------------------<  111<H>  3.5   |  30  |  0.56    Ca    8.2<L>      22 May 2019 07:09      ASSESSMENT AND PLAN: POD 3 right femur retrograde nail   1. Stable. Labs reviewed. Denies any dizziness/lightheadedness/fatigue. Will do orthostatic VS prior to getting out of bed- team aware.   2. Analgesic pain control  3. DVT prophylaxis: SQH q8hr  4. Weight Bearing Status:  TTWB in hinged knee brace   5. Disposition: Pending TOI placement

## 2019-05-22 NOTE — PROGRESS NOTE ADULT - SUBJECTIVE AND OBJECTIVE BOX
CC: Feeling well. No complaints  Denies cp, sob, dizziness.   Rest of ROS negative.     Vital Signs Last 24 Hrs  T(C): 37.1 (22 May 2019 08:31), Max: 37.3 (22 May 2019 04:35)  T(F): 98.8 (22 May 2019 08:31), Max: 99.1 (22 May 2019 04:35)  HR: 74 (22 May 2019 08:31) (74 - 80)  BP: 99/63 (22 May 2019 08:31) (99/63 - 116/71)  BP(mean): --  RR: 17 (22 May 2019 08:31) (16 - 17)  SpO2: 95% (22 May 2019 08:31) (95% - 96%)    PHYSICAL EXAMINATION  * General: Not in acute distress. Awake and alert. Lying comfortably in bed.  * Head: Normocephalic, atraumatic.  * HEENT: ears no discharge, eyes PERRLA, nose no discharge, throat no exudates, normal tonsils.  * Neck: no JVD, supple.  * Lungs: Clear to auscultation, no rales, no wheezes.  * Cardio: Regular rate and rhythm, no murmurs, no rubs, no gallops. Good peripheral pulses.  * Abdomen: Soft, non-tender, non-distended, tympanic to percussion, no rebound, no guarding, no rigidity. Bowel sounds present. No suprapubic or CVA tenderness.  * : Osullivan  * Extremities: Acyanotic, no edema.  * Skin: Warm and dry.  * Neuro: Alert and oriented x 3. No focal deficits. Motor strength is 5/5 throughout. Sensation intact. Cranial nerves II-XII grossly intact.                           7.5    7.39  )-----------( 196      ( 22 May 2019 07:09 )             23.6     05-22    142  |  104  |  7   ----------------------------<  111<H>  3.5   |  30  |  0.56    Ca    8.2<L>      22 May 2019 07:09               MEDICATIONS  (STANDING):  BUpivacaine liposome 1.3% Injectable (no eMAR) 20 milliLiter(s) Local Injection once  docusate sodium 100 milliGRAM(s) Oral three times a day  enoxaparin Injectable 40 milliGRAM(s) SubCutaneous daily  lactated ringers. 1000 milliLiter(s) (100 mL/Hr) IV Continuous <Continuous>  melatonin 3 milliGRAM(s) Oral at bedtime  polyethylene glycol 3350 17 Gram(s) Oral daily    MEDICATIONS  (PRN):  acetaminophen   Tablet .. 650 milliGRAM(s) Oral every 4 hours PRN Temp greater or equal to 38C (100.4F), Mild Pain (1 - 3)  aluminum hydroxide/magnesium hydroxide/simethicone Suspension 30 milliLiter(s) Oral four times a day PRN Indigestion  bisacodyl Suppository 10 milliGRAM(s) Rectal daily PRN If no bowel movement by postoperative day #2  magnesium hydroxide Suspension 30 milliLiter(s) Oral daily PRN Constipation  morphine  - Injectable 4 milliGRAM(s) IV Push every 4 hours PRN breakthrough floor  ondansetron Injectable 4 milliGRAM(s) IV Push every 6 hours PRN Nausea and/or Vomiting  senna 2 Tablet(s) Oral at bedtime PRN Constipation  traMADol 25 milliGRAM(s) Oral every 4 hours PRN Moderate Pain (4 - 6)  traMADol 50 milliGRAM(s) Oral every 4 hours PRN Severe Pain (7 - 10)

## 2019-05-22 NOTE — PROGRESS NOTE ADULT - ASSESSMENT
73F PMH HTN, h/o gastric bypass surgery 15 yrs ago, b/l knee replacement, presenting after a fall, admitted for R distal femur periprosthetic fx repair. Medicine consulted for medical clearance. Now POD-3 s/p R retrograde femoral nail, poly exchange.    1) Post-op anemia, s/p 2u pRBC  HD stable, currently asymptomatic.   No need for transfusion at present time.  * Transfuse for Hb>7g    2) Post-op state  * OOB-C  * Physical therapy evaluation  * Aggressive incentive spirometry  * Pain control and bowel regimen  * Mechanical LE ppx     3) HTN, controlled.   off bp meds.     4) VTE ppx.   * HSQ  * SCDs

## 2019-05-22 NOTE — PROGRESS NOTE ADULT - SUBJECTIVE AND OBJECTIVE BOX
SUBJECTIVE: Patient seen and examined. Pain controlled.  Pt did well o/n  No f/c/n/v/cp/sob.     OBJECTIVE:  NAD  Vital Signs Last 24 Hrs  T(C): 37.1 (22 May 2019 08:31), Max: 37.3 (22 May 2019 04:35)  T(F): 98.8 (22 May 2019 08:31), Max: 99.1 (22 May 2019 04:35)  HR: 74 (22 May 2019 08:31) (74 - 80)  BP: 99/63 (22 May 2019 08:31) (99/63 - 116/71)  BP(mean): --  RR: 17 (22 May 2019 08:31) (16 - 17)  SpO2: 95% (22 May 2019 08:31) (95% - 96%)    Affected extremity:          Dressing: clean/dry/intact , hinged knee brace          Sensation: SILT         Motor exam: 5/5 TA/GS/EHL         warm well perfused; capillary refill <3 seconds                          7.5    7.39  )-----------( 196      ( 22 May 2019 07:09 )             23.6     A/P :  Pt is a 74yo Female s/p R retrograde femoral nail, poly exchange 5/19  -    Pain control  -    DVT ppx: SCD, LVX 40 qd  -    Weight bearing status: TTWB, ROMAT limit 0-30 in bed, locked in extension when ambulating with PT   -   appreciate med recs  -    Dispo: TOI Laguna MD  PGY -4  Orthopaedic Surgery

## 2019-05-23 LAB
ANION GAP SERPL CALC-SCNC: 9 MMOL/L — SIGNIFICANT CHANGE UP (ref 5–17)
BUN SERPL-MCNC: 11 MG/DL — SIGNIFICANT CHANGE UP (ref 7–23)
CALCIUM SERPL-MCNC: 8.3 MG/DL — LOW (ref 8.4–10.5)
CHLORIDE SERPL-SCNC: 105 MMOL/L — SIGNIFICANT CHANGE UP (ref 96–108)
CO2 SERPL-SCNC: 28 MMOL/L — SIGNIFICANT CHANGE UP (ref 22–31)
CREAT SERPL-MCNC: 0.53 MG/DL — SIGNIFICANT CHANGE UP (ref 0.5–1.3)
GLUCOSE SERPL-MCNC: 100 MG/DL — HIGH (ref 70–99)
HCT VFR BLD CALC: 23.7 % — LOW (ref 34.5–45)
HGB BLD-MCNC: 7.5 G/DL — LOW (ref 11.5–15.5)
MCHC RBC-ENTMCNC: 27 PG — SIGNIFICANT CHANGE UP (ref 27–34)
MCHC RBC-ENTMCNC: 31.6 GM/DL — LOW (ref 32–36)
MCV RBC AUTO: 85.3 FL — SIGNIFICANT CHANGE UP (ref 80–100)
NRBC # BLD: 0 /100 WBCS — SIGNIFICANT CHANGE UP (ref 0–0)
PLATELET # BLD AUTO: 256 K/UL — SIGNIFICANT CHANGE UP (ref 150–400)
POTASSIUM SERPL-MCNC: 4.1 MMOL/L — SIGNIFICANT CHANGE UP (ref 3.5–5.3)
POTASSIUM SERPL-SCNC: 4.1 MMOL/L — SIGNIFICANT CHANGE UP (ref 3.5–5.3)
RBC # BLD: 2.78 M/UL — LOW (ref 3.8–5.2)
RBC # FLD: 14.3 % — SIGNIFICANT CHANGE UP (ref 10.3–14.5)
SODIUM SERPL-SCNC: 142 MMOL/L — SIGNIFICANT CHANGE UP (ref 135–145)
WBC # BLD: 6.95 K/UL — SIGNIFICANT CHANGE UP (ref 3.8–10.5)
WBC # FLD AUTO: 6.95 K/UL — SIGNIFICANT CHANGE UP (ref 3.8–10.5)

## 2019-05-23 PROCEDURE — 99232 SBSQ HOSP IP/OBS MODERATE 35: CPT

## 2019-05-23 RX ORDER — APIXABAN 2.5 MG/1
2.5 TABLET, FILM COATED ORAL EVERY 12 HOURS
Refills: 0 | Status: DISCONTINUED | OUTPATIENT
Start: 2019-05-23 | End: 2019-05-29

## 2019-05-23 RX ADMIN — Medication 100 MILLIGRAM(S): at 21:29

## 2019-05-23 RX ADMIN — APIXABAN 2.5 MILLIGRAM(S): 2.5 TABLET, FILM COATED ORAL at 18:44

## 2019-05-23 RX ADMIN — TRAMADOL HYDROCHLORIDE 25 MILLIGRAM(S): 50 TABLET ORAL at 13:00

## 2019-05-23 RX ADMIN — Medication 3 MILLIGRAM(S): at 21:29

## 2019-05-23 RX ADMIN — TRAMADOL HYDROCHLORIDE 25 MILLIGRAM(S): 50 TABLET ORAL at 12:20

## 2019-05-23 RX ADMIN — Medication 100 MILLIGRAM(S): at 05:53

## 2019-05-23 RX ADMIN — HEPARIN SODIUM 7500 UNIT(S): 5000 INJECTION INTRAVENOUS; SUBCUTANEOUS at 05:53

## 2019-05-23 NOTE — PROGRESS NOTE ADULT - SUBJECTIVE AND OBJECTIVE BOX
CC: Feeling well. No complaints  Denies cp, sob, dizziness.   Rest of ROS negative.     Vital Signs Last 24 Hrs  T(C): 36.9 (23 May 2019 08:25), Max: 37.6 (22 May 2019 16:42)  T(F): 98.4 (23 May 2019 08:25), Max: 99.6 (22 May 2019 16:42)  HR: 68 (23 May 2019 08:25) (68 - 82)  BP: 99/63 (23 May 2019 08:25) (99/63 - 115/55)  BP(mean): --  RR: 16 (23 May 2019 08:25) (16 - 17)  SpO2: 98% (23 May 2019 08:25) (95% - 98%)    PHYSICAL EXAMINATION  * General: Not in acute distress. Awake and alert. Lying comfortably in bed.  * Head: Normocephalic, atraumatic.  * HEENT: ears no discharge, eyes PERRLA, nose no discharge, throat no exudates, normal tonsils.  * Neck: no JVD, supple.  * Lungs: Clear to auscultation, no rales, no wheezes.  * Cardio: Regular rate and rhythm, no murmurs, no rubs, no gallops. Good peripheral pulses.  * Abdomen: Soft, non-tender, non-distended, tympanic to percussion, no rebound, no guarding, no rigidity. Bowel sounds present. No suprapubic or CVA tenderness.  * : Osullivan  * Extremities: Acyanotic, no edema.  * Skin: Warm and dry.  * Neuro: Alert and oriented x 3. No focal deficits. Motor strength is 5/5 throughout. Sensation intact. Cranial nerves II-XII grossly intact.                           7.5    6.95  )-----------( 256      ( 23 May 2019 06:27 )             23.7   05-23    142  |  105  |  11  ----------------------------<  100<H>  4.1   |  28  |  0.53    Ca    8.3<L>      23 May 2019 06:27    MEDICATIONS  (STANDING):  apixaban 2.5 milliGRAM(s) Oral every 12 hours  BUpivacaine liposome 1.3% Injectable (no eMAR) 20 milliLiter(s) Local Injection once  docusate sodium 100 milliGRAM(s) Oral three times a day  lactated ringers. 1000 milliLiter(s) (100 mL/Hr) IV Continuous <Continuous>  melatonin 3 milliGRAM(s) Oral at bedtime  polyethylene glycol 3350 17 Gram(s) Oral daily    MEDICATIONS  (PRN):  acetaminophen   Tablet .. 650 milliGRAM(s) Oral every 4 hours PRN Temp greater or equal to 38C (100.4F), Mild Pain (1 - 3)  aluminum hydroxide/magnesium hydroxide/simethicone Suspension 30 milliLiter(s) Oral four times a day PRN Indigestion  bisacodyl Suppository 10 milliGRAM(s) Rectal daily PRN If no bowel movement by postoperative day #2  magnesium hydroxide Suspension 30 milliLiter(s) Oral daily PRN Constipation  morphine  - Injectable 4 milliGRAM(s) IV Push every 4 hours PRN breakthrough floor  ondansetron Injectable 4 milliGRAM(s) IV Push every 6 hours PRN Nausea and/or Vomiting  senna 2 Tablet(s) Oral at bedtime PRN Constipation  traMADol 25 milliGRAM(s) Oral every 4 hours PRN Moderate Pain (4 - 6)  traMADol 50 milliGRAM(s) Oral every 4 hours PRN Severe Pain (7 - 10)

## 2019-05-23 NOTE — PROGRESS NOTE ADULT - ASSESSMENT
73F PMH HTN, h/o gastric bypass surgery 15 yrs ago, b/l knee replacement, presenting after a fall, admitted for R distal femur periprosthetic fx repair. Medicine consulted for medical clearance. Now POD-4 s/p R retrograde femoral nail, poly exchange.    1) Post-op anemia, s/p 2u pRBC  HD stable, currently asymptomatic.   No need for transfusion at present time.  * Transfuse for Hb>7g    2) Post-op state  * OOB-C  * Physical therapy evaluation  * Aggressive incentive spirometry  * Pain control and bowel regimen  * Mechanical LE ppx     3) HTN, controlled.   off bp meds.     4) VTE ppx.   * Can switch to Eliquis 2.5 bid  * SCDs

## 2019-05-23 NOTE — PROGRESS NOTE ADULT - SUBJECTIVE AND OBJECTIVE BOX
POST OPERATIVE DAY # 4 right femur retrograde IM nail   SUBJECTIVE: Patient seen and examined.  Pt without complaints. Feeling well. Sat in chair yesterday for 3 hours. Dressing changed by Dr. Astorga last night.   Denies chest pain/SOB/dizziness/n/v/HA   Pain well controlled.       OBJECTIVE:     Vital Signs Last 24 Hrs  T(C): 36.9 (23 May 2019 08:25), Max: 37.6 (22 May 2019 16:42)  T(F): 98.4 (23 May 2019 08:25), Max: 99.6 (22 May 2019 16:42)  HR: 68 (23 May 2019 08:25) (68 - 82)  BP: 99/63 (23 May 2019 08:25) (99/63 - 115/55)  BP(mean): --  RR: 16 (23 May 2019 08:25) (16 - 17)  SpO2: 98% (23 May 2019 08:25) (95% - 98%)    PE:          Dressing: clean/dry/intact. Brace in place.          Sensation: intact to light touch to patient's baseline BLE          Motor exam:  firing ehl/ta/gs/fhl c/w prior exams          Skin warm, well-perfused; capillary refill brisk             LABS:                        7.5    6.95  )-----------( 256      ( 23 May 2019 06:27 )             23.7     05-23    142  |  105  |  11  ----------------------------<  100<H>  4.1   |  28  |  0.53    Ca    8.3<L>      23 May 2019 06:27    ASSESSMENT AND PLAN: POD 4 right femur retrograde nail  1. Labs reviewed- stable. Eliquis 2.5mg BID started in anticipation of flight home. Weight bearing changed to PWB RLE- Will aim to improve PT/function for flight home to [place] next week.   2. Analgesic pain control  3. DVT prophylaxis: Eliquis 2.5mg BID, SCDs  4. Weight Bearing Status:  can increase to PWB RLE with knee locked in extension. Also encourage ROM 0-30 while in bed. PT aware.  5. Disposition: Pending progress with PT/new WB status

## 2019-05-23 NOTE — PROGRESS NOTE ADULT - SUBJECTIVE AND OBJECTIVE BOX
SUBJECTIVE: Patient seen and examined. Pain controlled.  Pt did well o/n  No f/c/n/v/cp/sob.     OBJECTIVE:  NAD  Vital Signs Last 24 Hrs  T(C): 36.9 (23 May 2019 08:25), Max: 37.6 (22 May 2019 16:42)  T(F): 98.4 (23 May 2019 08:25), Max: 99.6 (22 May 2019 16:42)  HR: 68 (23 May 2019 08:25) (68 - 82)  BP: 99/63 (23 May 2019 08:25) (99/63 - 115/55)  BP(mean): --  RR: 16 (23 May 2019 08:25) (16 - 17)  SpO2: 98% (23 May 2019 08:25) (95% - 98%)    Affected extremity:          Dressing: clean/dry/intact , hinged knee brace          Sensation: SILT         Motor exam: 5/5 TA/GS/EHL         warm well perfused; capillary refill <3 seconds                               7.5    6.95  )-----------( 256      ( 23 May 2019 06:27 )             23.7     A/P :  Pt is a 72yo Female s/p R retrograde femoral nail, poly exchange 5/19  -    Pain control  -    DVT ppx: SCD, LVX 40 qd, transition to eloquis 2.5mg when hgb stable  -    Weight bearing status: TTWB, ROMAT limit 0-30 in bed, locked in extension when ambulating with PT   -   appreciate med recs  -    Dispo: TOI Laguna MD  PGY -4  Orthopaedic Surgery

## 2019-05-24 PROCEDURE — 99232 SBSQ HOSP IP/OBS MODERATE 35: CPT

## 2019-05-24 RX ORDER — BENZOCAINE AND MENTHOL 5; 1 G/100ML; G/100ML
1 LIQUID ORAL EVERY 4 HOURS
Refills: 0 | Status: DISCONTINUED | OUTPATIENT
Start: 2019-05-24 | End: 2019-05-29

## 2019-05-24 RX ADMIN — APIXABAN 2.5 MILLIGRAM(S): 2.5 TABLET, FILM COATED ORAL at 05:33

## 2019-05-24 RX ADMIN — Medication 3 MILLIGRAM(S): at 22:08

## 2019-05-24 RX ADMIN — TRAMADOL HYDROCHLORIDE 25 MILLIGRAM(S): 50 TABLET ORAL at 13:44

## 2019-05-24 RX ADMIN — TRAMADOL HYDROCHLORIDE 25 MILLIGRAM(S): 50 TABLET ORAL at 12:44

## 2019-05-24 RX ADMIN — APIXABAN 2.5 MILLIGRAM(S): 2.5 TABLET, FILM COATED ORAL at 18:05

## 2019-05-24 RX ADMIN — Medication 100 MILLIGRAM(S): at 22:08

## 2019-05-24 NOTE — PROGRESS NOTE ADULT - SUBJECTIVE AND OBJECTIVE BOX
CC: No overnight events, no complaints.   Feeling well, pain is overall controlled.  Tolerates PO diet (+); Urination (+); Walked with PT (+)  Denies cp, sob, dizziness, HA, abdominal pain, n/v.  Rest of ROS negative.     Vital Signs Last 24 Hrs  T(C): 36.6 (24 May 2019 09:10), Max: 37.3 (23 May 2019 20:38)  T(F): 97.9 (24 May 2019 09:10), Max: 99.1 (23 May 2019 20:38)  HR: 73 (24 May 2019 09:10) (71 - 84)  BP: 116/62 (24 May 2019 09:10) (97/63 - 116/62)  BP(mean): --  RR: 17 (24 May 2019 09:10) (16 - 17)  SpO2: 95% (24 May 2019 09:10) (95% - 96%)    PHYSICAL EXAMINATION  * General: Not in acute distress. Awake and alert. Lying comfortably in bed.  * Head: Normocephalic, atraumatic.  * HEENT: ears no discharge, eyes PERRLA, nose no discharge, throat no exudates, normal tonsils.  * Neck: no JVD, supple.  * Lungs: Clear to auscultation, no rales, no wheezes.  * Cardio: Regular rate and rhythm, no murmurs, no rubs, no gallops. Good peripheral pulses.  * Abdomen: Soft, non-tender, non-distended, tympanic to percussion, no rebound, no guarding, no rigidity. Bowel sounds present. No suprapubic or CVA tenderness.  * : Osullivan  * Extremities: Acyanotic, no edema.  * Skin: Warm and dry.  * Neuro: Alert and oriented x 3. No focal deficits. Motor strength is 5/5 throughout. Sensation intact. Cranial nerves II-XII grossly intact.     MEDICATIONS  (STANDING):  apixaban 2.5 milliGRAM(s) Oral every 12 hours  BUpivacaine liposome 1.3% Injectable (no eMAR) 20 milliLiter(s) Local Injection once  docusate sodium 100 milliGRAM(s) Oral three times a day  lactated ringers. 1000 milliLiter(s) (100 mL/Hr) IV Continuous <Continuous>  melatonin 3 milliGRAM(s) Oral at bedtime  polyethylene glycol 3350 17 Gram(s) Oral daily    MEDICATIONS  (PRN):  acetaminophen   Tablet .. 650 milliGRAM(s) Oral every 4 hours PRN Temp greater or equal to 38C (100.4F), Mild Pain (1 - 3)  aluminum hydroxide/magnesium hydroxide/simethicone Suspension 30 milliLiter(s) Oral four times a day PRN Indigestion  bisacodyl Suppository 10 milliGRAM(s) Rectal daily PRN If no bowel movement by postoperative day #2  magnesium hydroxide Suspension 30 milliLiter(s) Oral daily PRN Constipation  morphine  - Injectable 4 milliGRAM(s) IV Push every 4 hours PRN breakthrough floor  ondansetron Injectable 4 milliGRAM(s) IV Push every 6 hours PRN Nausea and/or Vomiting  senna 2 Tablet(s) Oral at bedtime PRN Constipation  traMADol 25 milliGRAM(s) Oral every 4 hours PRN Moderate Pain (4 - 6)  traMADol 50 milliGRAM(s) Oral every 4 hours PRN Severe Pain (7 - 10)

## 2019-05-24 NOTE — DISCHARGE NOTE PROVIDER - NSDCFUADDINST_GEN_ALL_CORE_FT
You are partial weight bearing with knee brace in place.  No strenuous activity, heavy lifting, driving or returning to work until cleared by MD.  You may shower - dressing is water-resistant, no soaking in bathtubs.  Remove dressing after post op day 5-7, then leave incision open to air. Keep incision clean and dry.  Try to have regular bowel movements, take stool softener or laxative if necessary.  May take Pepcid or Zantac for upset stomach.  May take Aleve or Naproxen instead of Meloxicam/Celebrex.  Swelling may travel all the way down leg to foot, this is normal and will subside in a few weeks.  Call to schedule an appt with your orthopedist in [place] for follow up, if you have staples or sutures they will be removed in office.  Contact your doctor if you experience: fever greater than 101.5, chills, chest pain, difficulty breathing, redness or excessive drainage around the incision, other concerns.  Follow up with your primary care provider. You are partial weight bearing with knee brace in place.  No strenuous activity, heavy lifting, driving or returning to work until cleared by MD.  You may shower - dressing is water-resistant, no soaking in bathtubs.  Remove dressing after post op day 5-7, then leave incision open to air. Keep incision clean and dry.  Try to have regular bowel movements, take stool softener or laxative if necessary.  May take Pepcid or Zantac for upset stomach.  Swelling may travel all the way down leg to foot, this is normal and will subside in a few weeks.  Call to schedule an appt with your orthopedist in [place] for follow up, if you have staples or sutures they will be removed in office.  Contact your doctor if you experience: fever greater than 101.5, chills, chest pain, difficulty breathing, redness or excessive drainage around the incision, other concerns.  Follow up with your primary care provider. You are partial weight bearing on your right knee with knee brace in place and locked in extension.  you may range of motion your right knee up to 60degrees when laying in bed.  No strenuous activity, heavy lifting, driving or returning to work until cleared by MD.  You may shower - dressing is water-resistant, no soaking in bathtubs.  Remove dressing after post op day 5-7, then leave incision open to air. Keep incision clean and dry.  Try to have regular bowel movements, take stool softener or laxative if necessary.  May take Pepcid or Zantac for upset stomach.  Swelling may travel all the way down leg to foot, this is normal and will subside in a few weeks.  Call to schedule an appt with your orthopedist at home for follow up within the next week, if you have staples or sutures they will be removed in office.  Contact your doctor if you experience: fever greater than 101.5, chills, chest pain, difficulty breathing, redness or excessive drainage around the incision, other concerns.  Follow up with your primary care provider.

## 2019-05-24 NOTE — DISCHARGE NOTE PROVIDER - CARE PROVIDER_API CALL
Nirmal Astorga)  [facility]  Orthopedics  [address], [address] [place] [address]  Phone: [phone]  Fax: [phone]  Follow Up Time:

## 2019-05-24 NOTE — DISCHARGE NOTE PROVIDER - NSDCCPCAREPLAN_GEN_ALL_CORE_FT
PRINCIPAL DISCHARGE DIAGNOSIS  Diagnosis: Closed nondisplaced fracture of distal epiphysis of right femur, initial encounter  Assessment and Plan of Treatment: Closed nondisplaced fracture of distal epiphysis of right femur, initial encounter

## 2019-05-24 NOTE — DISCHARGE NOTE PROVIDER - HOSPITAL COURSE
Admitted    Surgery    Love-op Antibiotics    Pain control    DVT prophylaxis    OOB/Physical Therapy Admitted    Surgery    Love-op Antibiotics    Pain control    DVT prophylaxis    PT/PWB right knee  with juan in place locked in extension    OOB     Medicine consult Admitted    Surgery    Love-op Antibiotics    Pain control    DVT prophylaxis    PT/PWB right knee  with juan in place locked in extension with ROM in bed     OOB     Medicine consult    Patient required 2 units of packed red blood cells postoperatively for symptomatic anemia. She has done well postoperatively. She is currently awaiting discharge back to [place]. Postoperative care has already been arranged with Dr. Segura at [facility]. She is seeing him on May 31, 2019

## 2019-05-24 NOTE — PROGRESS NOTE ADULT - ASSESSMENT
73F PMH HTN, h/o gastric bypass surgery 15 yrs ago, b/l knee replacement, presenting after a fall, admitted for R distal femur periprosthetic fx repair. Medicine consulted for medical clearance. Now POD-5 s/p R retrograde femoral nail, poly exchange.    1) Post-op anemia, s/p 2u pRBC  HD stable, currently asymptomatic.   No need for transfusion at present time.  * Transfuse for Hb>7g    2) Post-op state  * OOB-C  * Physical therapy evaluation  * Aggressive incentive spirometry  * Pain control and bowel regimen  * Mechanical LE ppx     3) HTN, controlled.   off bp meds.     4) VTE ppx.   * Can switch to Eliquis 2.5 bid  * SCDs

## 2019-05-24 NOTE — DISCHARGE NOTE PROVIDER - NSDCCPTREATMENT_GEN_ALL_CORE_FT
PRINCIPAL PROCEDURE  Procedure: Retrograde intramedullary nailing of right femur  Findings and Treatment: poly exchange

## 2019-05-24 NOTE — PROGRESS NOTE ADULT - SUBJECTIVE AND OBJECTIVE BOX
SUBJECTIVE: Patient seen and examined. Pain controlled.  Pt did well o/n  No f/c/n/v/cp/sob. walked 10 steps with PT    OBJECTIVE:  NAD  Vital Signs Last 24 Hrs  T(C): 37.1 (24 May 2019 04:52), Max: 37.3 (23 May 2019 20:38)  T(F): 98.8 (24 May 2019 04:52), Max: 99.1 (23 May 2019 20:38)  HR: 71 (24 May 2019 04:52) (68 - 84)  BP: 109/55 (24 May 2019 04:52) (97/63 - 109/55)  BP(mean): --  RR: 16 (24 May 2019 04:52) (16 - 17)  SpO2: 96% (24 May 2019 04:52) (95% - 98%)    Affected extremity:          Dressing: clean/dry/intact , hinged knee brace          Sensation: SILT         Motor exam: 5/5 TA/GS/EHL         warm well perfused; capillary refill <3 seconds                             7.5    6.95  )-----------( 256      ( 23 May 2019 06:27 )             23.7     A/P :  Pt is a 72yo Female s/p R retrograde femoral nail, poly exchange 5/19  -    Pain control  -    DVT ppx: SCD, LVX 40 qd, transition to eloquis 2.5mg when hgb stable  -    Weight bearing status: TTWB, ROMAT limit 0-30 in bed, locked in extension when ambulating with PT   -   appreciate med recs  -    Dispo: TOI Laguna MD  PGY -4  Orthopaedic Surgery SUBJECTIVE: Patient seen and examined. Pain controlled.  Pt did well o/n  No f/c/n/v/cp/sob. walked 10 steps with PT    OBJECTIVE:  NAD  Vital Signs Last 24 Hrs  T(C): 37.1 (24 May 2019 04:52), Max: 37.3 (23 May 2019 20:38)  T(F): 98.8 (24 May 2019 04:52), Max: 99.1 (23 May 2019 20:38)  HR: 71 (24 May 2019 04:52) (68 - 84)  BP: 109/55 (24 May 2019 04:52) (97/63 - 109/55)  BP(mean): --  RR: 16 (24 May 2019 04:52) (16 - 17)  SpO2: 96% (24 May 2019 04:52) (95% - 98%)    Affected extremity:          Dressing: clean/dry/intact , hinged knee brace          Sensation: SILT         Motor exam: 5/5 TA/GS/EHL         warm well perfused; capillary refill <3 seconds                             7.5    6.95  )-----------( 256      ( 23 May 2019 06:27 )             23.7     A/P :  Pt is a 74yo Female s/p R retrograde femoral nail, poly exchange 5/19  -    Pain control  -    DVT ppx: SCD, LVX 40 qd, transition to eloquis 2.5mg when hgb stable  -    Weight bearing status: PWB, ROM AT limit 0-30 in bed, locked in extension when ambulating with PT   -   appreciate med recs  -    Dispo: TOI Laguna MD  PGY -4  Orthopaedic Surgery

## 2019-05-24 NOTE — OCCUPATIONAL THERAPY INITIAL EVALUATION ADULT - PERTINENT HX OF CURRENT PROBLEM, REHAB EVAL
73F hx bilateral TKA at [facility] and previous hx HTN. Was on losartan but no longer on it, presenting to ED s/p mechanical fall down 2 steps onto her right knee with right knee pain. Small knee bruise but no open wounds or erythema. No other injuries. No head trauma. No LOC.  Right knee retrograde femoral IMN, poly exchange

## 2019-05-25 LAB
ANION GAP SERPL CALC-SCNC: 12 MMOL/L — SIGNIFICANT CHANGE UP (ref 5–17)
BUN SERPL-MCNC: 13 MG/DL — SIGNIFICANT CHANGE UP (ref 7–23)
CALCIUM SERPL-MCNC: 9.1 MG/DL — SIGNIFICANT CHANGE UP (ref 8.4–10.5)
CHLORIDE SERPL-SCNC: 102 MMOL/L — SIGNIFICANT CHANGE UP (ref 96–108)
CO2 SERPL-SCNC: 27 MMOL/L — SIGNIFICANT CHANGE UP (ref 22–31)
CREAT SERPL-MCNC: 0.6 MG/DL — SIGNIFICANT CHANGE UP (ref 0.5–1.3)
GLUCOSE SERPL-MCNC: 127 MG/DL — HIGH (ref 70–99)
HCT VFR BLD CALC: 27.7 % — LOW (ref 34.5–45)
HGB BLD-MCNC: 8.4 G/DL — LOW (ref 11.5–15.5)
MCHC RBC-ENTMCNC: 26.4 PG — LOW (ref 27–34)
MCHC RBC-ENTMCNC: 30.3 GM/DL — LOW (ref 32–36)
MCV RBC AUTO: 87.1 FL — SIGNIFICANT CHANGE UP (ref 80–100)
NRBC # BLD: 0 /100 WBCS — SIGNIFICANT CHANGE UP (ref 0–0)
PLATELET # BLD AUTO: 402 K/UL — HIGH (ref 150–400)
POTASSIUM SERPL-MCNC: 4.1 MMOL/L — SIGNIFICANT CHANGE UP (ref 3.5–5.3)
POTASSIUM SERPL-SCNC: 4.1 MMOL/L — SIGNIFICANT CHANGE UP (ref 3.5–5.3)
RBC # BLD: 3.18 M/UL — LOW (ref 3.8–5.2)
RBC # FLD: 14.8 % — HIGH (ref 10.3–14.5)
SODIUM SERPL-SCNC: 141 MMOL/L — SIGNIFICANT CHANGE UP (ref 135–145)
WBC # BLD: 8.65 K/UL — SIGNIFICANT CHANGE UP (ref 3.8–10.5)
WBC # FLD AUTO: 8.65 K/UL — SIGNIFICANT CHANGE UP (ref 3.8–10.5)

## 2019-05-25 PROCEDURE — 99232 SBSQ HOSP IP/OBS MODERATE 35: CPT

## 2019-05-25 RX ADMIN — APIXABAN 2.5 MILLIGRAM(S): 2.5 TABLET, FILM COATED ORAL at 17:39

## 2019-05-25 RX ADMIN — APIXABAN 2.5 MILLIGRAM(S): 2.5 TABLET, FILM COATED ORAL at 06:01

## 2019-05-25 RX ADMIN — TRAMADOL HYDROCHLORIDE 50 MILLIGRAM(S): 50 TABLET ORAL at 13:27

## 2019-05-25 RX ADMIN — Medication 3 MILLIGRAM(S): at 23:08

## 2019-05-25 RX ADMIN — Medication 100 MILLIGRAM(S): at 12:07

## 2019-05-25 RX ADMIN — TRAMADOL HYDROCHLORIDE 50 MILLIGRAM(S): 50 TABLET ORAL at 14:16

## 2019-05-25 RX ADMIN — Medication 100 MILLIGRAM(S): at 23:08

## 2019-05-25 NOTE — PROGRESS NOTE ADULT - ASSESSMENT
A/P: 73yFemale POD6 s/p R retrograde femur IMN for periprosthetic femur fx  - Stable  - Pain/Nausea Control adequate  - Home meds  - AM labs pending -- f/u Hgb  - DVT ppx: Eliquis 2.5 BID  - WBS: PWB in [place]  - PT: recommending TOI      Ortho Pager [phone]

## 2019-05-25 NOTE — PROGRESS NOTE ADULT - ASSESSMENT
73F PMH HTN, h/o gastric bypass surgery 15 yrs ago, b/l knee replacement, presenting after a fall, admitted for R distal femur periprosthetic fx repair. Medicine consulted for medical clearance. Now POD-6 s/p R retrograde femoral nail, poly exchange.    1) Post-op anemia, now improving.   HD stable, currently asymptomatic.   No need for transfusion at present time.  * Transfuse for Hb>7g    2) Post-op state  * OOB-C  * Physical therapy evaluation  * Aggressive incentive spirometry  * Pain control and bowel regimen  * Mechanical LE ppx     3) HTN, controlled.   off bp meds.     4) VTE ppx.   * C/w Eliquis 2.5 bid  * SCDs

## 2019-05-25 NOTE — PROGRESS NOTE ADULT - SUBJECTIVE AND OBJECTIVE BOX
Ortho Note    Pt comfortable without complaints, pain controlled  Denies CP, SOB, N/V, numbness/tingling     Vital Signs Last 24 Hrs  T(C): 36.7 (05-25-19 @ 08:43), Max: 36.8 (05-25-19 @ 06:00)  T(F): 98 (05-25-19 @ 08:43), Max: 98.2 (05-25-19 @ 06:00)  HR: 68 (05-25-19 @ 08:43) (68 - 72)  BP: 111/58 (05-25-19 @ 08:43) (111/58 - 116/71)  BP(mean): --  RR: 16 (05-25-19 @ 08:43) (15 - 16)  SpO2: 96% (05-25-19 @ 08:43) (95% - 96%)    VSS  General: A&Ox3, NAD  RLE: Ace / Aquacell DSG C/D/I; Bee brace in place  Pulses: Foot WWP; DP pulse 2+; Cap refill < 2 sec  Sensation: SILT distally and symmetric to contralateral extremity  Motor: TA/EHL/FHL/GS 5/5 and symmetric to contralateral extremity

## 2019-05-26 LAB
ANION GAP SERPL CALC-SCNC: 9 MMOL/L — SIGNIFICANT CHANGE UP (ref 5–17)
BLD GP AB SCN SERPL QL: NEGATIVE — SIGNIFICANT CHANGE UP
BUN SERPL-MCNC: 15 MG/DL — SIGNIFICANT CHANGE UP (ref 7–23)
CALCIUM SERPL-MCNC: 8.9 MG/DL — SIGNIFICANT CHANGE UP (ref 8.4–10.5)
CHLORIDE SERPL-SCNC: 102 MMOL/L — SIGNIFICANT CHANGE UP (ref 96–108)
CO2 SERPL-SCNC: 27 MMOL/L — SIGNIFICANT CHANGE UP (ref 22–31)
CREAT SERPL-MCNC: 0.66 MG/DL — SIGNIFICANT CHANGE UP (ref 0.5–1.3)
GLUCOSE SERPL-MCNC: 101 MG/DL — HIGH (ref 70–99)
HCT VFR BLD CALC: 25.8 % — LOW (ref 34.5–45)
HGB BLD-MCNC: 8 G/DL — LOW (ref 11.5–15.5)
MCHC RBC-ENTMCNC: 26.8 PG — LOW (ref 27–34)
MCHC RBC-ENTMCNC: 31 GM/DL — LOW (ref 32–36)
MCV RBC AUTO: 86.6 FL — SIGNIFICANT CHANGE UP (ref 80–100)
NRBC # BLD: 0 /100 WBCS — SIGNIFICANT CHANGE UP (ref 0–0)
PLATELET # BLD AUTO: 398 K/UL — SIGNIFICANT CHANGE UP (ref 150–400)
POTASSIUM SERPL-MCNC: 4.2 MMOL/L — SIGNIFICANT CHANGE UP (ref 3.5–5.3)
POTASSIUM SERPL-SCNC: 4.2 MMOL/L — SIGNIFICANT CHANGE UP (ref 3.5–5.3)
RBC # BLD: 2.98 M/UL — LOW (ref 3.8–5.2)
RBC # FLD: 14.8 % — HIGH (ref 10.3–14.5)
RH IG SCN BLD-IMP: POSITIVE — SIGNIFICANT CHANGE UP
SODIUM SERPL-SCNC: 138 MMOL/L — SIGNIFICANT CHANGE UP (ref 135–145)
WBC # BLD: 9.28 K/UL — SIGNIFICANT CHANGE UP (ref 3.8–10.5)
WBC # FLD AUTO: 9.28 K/UL — SIGNIFICANT CHANGE UP (ref 3.8–10.5)

## 2019-05-26 PROCEDURE — 99232 SBSQ HOSP IP/OBS MODERATE 35: CPT

## 2019-05-26 RX ADMIN — TRAMADOL HYDROCHLORIDE 50 MILLIGRAM(S): 50 TABLET ORAL at 12:08

## 2019-05-26 RX ADMIN — Medication 100 MILLIGRAM(S): at 11:18

## 2019-05-26 RX ADMIN — Medication 650 MILLIGRAM(S): at 22:45

## 2019-05-26 RX ADMIN — APIXABAN 2.5 MILLIGRAM(S): 2.5 TABLET, FILM COATED ORAL at 17:11

## 2019-05-26 RX ADMIN — APIXABAN 2.5 MILLIGRAM(S): 2.5 TABLET, FILM COATED ORAL at 06:16

## 2019-05-26 RX ADMIN — Medication 650 MILLIGRAM(S): at 21:45

## 2019-05-26 RX ADMIN — Medication 3 MILLIGRAM(S): at 21:45

## 2019-05-26 RX ADMIN — TRAMADOL HYDROCHLORIDE 50 MILLIGRAM(S): 50 TABLET ORAL at 11:18

## 2019-05-26 RX ADMIN — Medication 100 MILLIGRAM(S): at 21:45

## 2019-05-26 NOTE — PROGRESS NOTE ADULT - SUBJECTIVE AND OBJECTIVE BOX
Ortho Note    Pt comfortable without complaints, pain controlled  Denies CP, SOB, N/V, numbness/tingling     Vital Signs Last 24 Hrs  T(C): 37 (05-26-19 @ 05:03), Max: 37 (05-26-19 @ 05:03)  T(F): 98.6 (05-26-19 @ 05:03), Max: 98.6 (05-26-19 @ 05:03)  HR: 73 (05-26-19 @ 05:03) (73 - 73)  BP: 96/53 (05-26-19 @ 05:03) (96/53 - 96/53)  BP(mean): --  RR: 16 (05-26-19 @ 05:03) (16 - 16)  SpO2: 98% (05-26-19 @ 05:03) (98% - 98%)    General: A&Ox3, NAD  RLE: Ace / Aquacell DSG C/D/I; [place] brace in place  Pulses: Foot WWP; DP pulse 2+; Cap refill < 2 sec  Sensation: SILT distally and symmetric to contralateral extremity  Motor: TA/EHL/FHL/GS 5/5 and symmetric to contralateral extremity                        8.0    9.28  )-----------( 398      ( 26 May 2019 06:50 )             25.8   25 May 2019 10:38    141    |  102    |  13     ----------------------------<  127    4.1     |  27     |  0.60           A/P: 73F POD7 s/p R retrograde femur IMN for periprosthetic femur fx  - Stable  - Pain/Nausea Control adequate  - DVT ppx: Eliquis 2.5 BID  - WBS: PWB in [place]  - PT: recommending TOI    Ortho Pager [phone]

## 2019-05-26 NOTE — PROGRESS NOTE ADULT - SUBJECTIVE AND OBJECTIVE BOX
CC: No overnight events, no complaints.   Feeling well, pain is overall controlled.  Tolerates PO diet (+); Urination (+); Walked with PT (+)  Denies cp, sob, dizziness, HA, abdominal pain, n/v.  Rest of ROS negative.     Vital Signs Last 24 Hrs  T(C): 36.7 (26 May 2019 08:49), Max: 37.1 (25 May 2019 20:02)  T(F): 98 (26 May 2019 08:49), Max: 98.8 (25 May 2019 20:02)  HR: 69 (26 May 2019 08:49) (69 - 80)  BP: 109/57 (26 May 2019 08:49) (96/53 - 117/65)  BP(mean): --  RR: 15 (26 May 2019 08:49) (15 - 17)  SpO2: 97% (26 May 2019 08:49) (97% - 98%)    PHYSICAL EXAMINATION  * General: Not in acute distress. Awake and alert. Lying comfortably in bed.  * Head: Normocephalic, atraumatic.  * HEENT: ears no discharge, eyes PERRLA, nose no discharge, throat no exudates, normal tonsils.  * Neck: no JVD, supple.  * Lungs: Clear to auscultation, no rales, no wheezes.  * Cardio: Regular rate and rhythm, no murmurs, no rubs, no gallops. Good peripheral pulses.  * Abdomen: Soft, non-tender, non-distended, tympanic to percussion, no rebound, no guarding, no rigidity. Bowel sounds present. No suprapubic or CVA tenderness.  * : Osullivan  * Extremities: Acyanotic, no edema.  * Skin: Warm and dry.  * Neuro: Alert and oriented x 3. No focal deficits. Motor strength is 5/5 throughout. Sensation intact. Cranial nerves II-XII grossly intact.                              8.0    9.28  )-----------( 398      ( 26 May 2019 06:50 )             25.8 05-26    138  |  102  |  15  ----------------------------<  101<H>  4.2   |  27  |  0.66    Ca    8.9      26 May 2019 06:50    MEDICATIONS  (STANDING):  apixaban 2.5 milliGRAM(s) Oral every 12 hours  BUpivacaine liposome 1.3% Injectable (no eMAR) 20 milliLiter(s) Local Injection once  docusate sodium 100 milliGRAM(s) Oral three times a day  lactated ringers. 1000 milliLiter(s) (100 mL/Hr) IV Continuous <Continuous>  melatonin 3 milliGRAM(s) Oral at bedtime  polyethylene glycol 3350 17 Gram(s) Oral daily    MEDICATIONS  (PRN):  acetaminophen   Tablet .. 650 milliGRAM(s) Oral every 4 hours PRN Temp greater or equal to 38C (100.4F), Mild Pain (1 - 3)  aluminum hydroxide/magnesium hydroxide/simethicone Suspension 30 milliLiter(s) Oral four times a day PRN Indigestion  benzocaine 15 mG/menthol 3.6 mG Lozenge 1 Lozenge Oral every 4 hours PRN Sore Throat  bisacodyl Suppository 10 milliGRAM(s) Rectal daily PRN If no bowel movement by postoperative day #2  magnesium hydroxide Suspension 30 milliLiter(s) Oral daily PRN Constipation  morphine  - Injectable 4 milliGRAM(s) IV Push every 4 hours PRN breakthrough floor  ondansetron Injectable 4 milliGRAM(s) IV Push every 6 hours PRN Nausea and/or Vomiting  senna 2 Tablet(s) Oral at bedtime PRN Constipation  traMADol 25 milliGRAM(s) Oral every 4 hours PRN Moderate Pain (4 - 6)  traMADol 50 milliGRAM(s) Oral every 4 hours PRN Severe Pain (7 - 10)

## 2019-05-26 NOTE — PROGRESS NOTE ADULT - ASSESSMENT
73F PMH HTN, h/o gastric bypass surgery 15 yrs ago, b/l knee replacement, presenting after a fall, admitted for R distal femur periprosthetic fx repair. Medicine consulted for medical clearance. Now POD-7 s/p R retrograde femoral nail, poly exchange.    1) Post-op anemia, now improving.   HD stable, currently asymptomatic.   No need for transfusion at present time.  * Transfuse for Hb>7g    2) Post-op state  * OOB-C  * Physical therapy evaluation  * Aggressive incentive spirometry  * Pain control and bowel regimen  * Mechanical LE ppx     3) HTN, controlled.   off bp meds.     4) VTE ppx.   * C/w Eliquis 2.5 bid  * SCDs    Medically optimized for discharge

## 2019-05-27 RX ADMIN — Medication 100 MILLIGRAM(S): at 12:48

## 2019-05-27 RX ADMIN — Medication 650 MILLIGRAM(S): at 21:30

## 2019-05-27 RX ADMIN — APIXABAN 2.5 MILLIGRAM(S): 2.5 TABLET, FILM COATED ORAL at 06:09

## 2019-05-27 RX ADMIN — APIXABAN 2.5 MILLIGRAM(S): 2.5 TABLET, FILM COATED ORAL at 17:33

## 2019-05-27 RX ADMIN — Medication 650 MILLIGRAM(S): at 20:59

## 2019-05-27 RX ADMIN — Medication 100 MILLIGRAM(S): at 06:08

## 2019-05-27 RX ADMIN — Medication 3 MILLIGRAM(S): at 21:00

## 2019-05-27 RX ADMIN — TRAMADOL HYDROCHLORIDE 25 MILLIGRAM(S): 50 TABLET ORAL at 13:30

## 2019-05-27 RX ADMIN — TRAMADOL HYDROCHLORIDE 25 MILLIGRAM(S): 50 TABLET ORAL at 12:48

## 2019-05-27 NOTE — PROGRESS NOTE ADULT - SUBJECTIVE AND OBJECTIVE BOX
Ortho Note    Pt comfortable without complaints, pain controlled  Denies CP, SOB, N/V, numbness/tingling     Vital Signs Last 24 Hrs  T(C): 36.7 (27 May 2019 06:05), Max: 37.4 (26 May 2019 20:09)  T(F): 98.1 (27 May 2019 06:05), Max: 99.3 (26 May 2019 20:09)  HR: 65 (27 May 2019 06:05) (65 - 81)  BP: 107/66 (27 May 2019 06:05) (97/63 - 117/56)  BP(mean): --  RR: 17 (27 May 2019 06:05) (15 - 17)  SpO2: 100% (27 May 2019 06:05) (96% - 100%)    VSS  General: A&Ox3, NAD  RLE: Ace / Aquacell DSG C/D/I; Betsy brace in place  Pulses: Foot WWP; DP pulse 2+; Cap refill < 2 sec  Sensation: SILT distally and symmetric to contralateral extremity  Motor: TA/EHL/FHL/GS 5/5 and symmetric to contralateral extremity                            8.0    9.28  )-----------( 398      ( 26 May 2019 06:50 )             25.8   26 May 2019 06:50    138    |  102    |  15     ----------------------------<  101    4.2     |  27     |  0.66

## 2019-05-27 NOTE — PROGRESS NOTE ADULT - ASSESSMENT
A/P: 73yFemale POD8 s/p R retrograde femur IMN for periprosthetic femur fx  - Stable  - Pain/Nausea Control adequate  - Home meds  - Hgb has stabilized, today's AM labs pending -- f/u Hgb  - DVT ppx: Eliquis 2.5 BID  - WBS: PWB in [place]  - PT: recommending TOI  - Plan for likely d/c to airport Wednesday      Ortho Pager [phone] A/P: 73yFemale POD8 s/p R retrograde femur IMN for periprosthetic femur fx  - Stable  - Pain/Nausea Control adequate  - Home meds  - Hgb has stabilized, today's AM labs pending -- f/u Hgb  - DVT ppx: Eliquis 2.5 BID  - WBS: PWB in [place]  - PT: recommending TOI  - Plan for likely d/c to airport Wednesday      Ortho Pager [phone]    Patient has no clinical evidence of orthostatic hypotension. There is no further down trending of her hemoglobin. She is clinically stable awaiting disposition and insurance authorization for her disposition back to [place].

## 2019-05-28 LAB
ANION GAP SERPL CALC-SCNC: 9 MMOL/L — SIGNIFICANT CHANGE UP (ref 5–17)
BUN SERPL-MCNC: 17 MG/DL — SIGNIFICANT CHANGE UP (ref 7–23)
CALCIUM SERPL-MCNC: 9 MG/DL — SIGNIFICANT CHANGE UP (ref 8.4–10.5)
CHLORIDE SERPL-SCNC: 104 MMOL/L — SIGNIFICANT CHANGE UP (ref 96–108)
CO2 SERPL-SCNC: 26 MMOL/L — SIGNIFICANT CHANGE UP (ref 22–31)
CREAT SERPL-MCNC: 0.66 MG/DL — SIGNIFICANT CHANGE UP (ref 0.5–1.3)
GLUCOSE SERPL-MCNC: 116 MG/DL — HIGH (ref 70–99)
HCT VFR BLD CALC: 27.8 % — LOW (ref 34.5–45)
HGB BLD-MCNC: 8.4 G/DL — LOW (ref 11.5–15.5)
MCHC RBC-ENTMCNC: 26.9 PG — LOW (ref 27–34)
MCHC RBC-ENTMCNC: 30.2 GM/DL — LOW (ref 32–36)
MCV RBC AUTO: 89.1 FL — SIGNIFICANT CHANGE UP (ref 80–100)
NRBC # BLD: 0 /100 WBCS — SIGNIFICANT CHANGE UP (ref 0–0)
PLATELET # BLD AUTO: 495 K/UL — HIGH (ref 150–400)
POTASSIUM SERPL-MCNC: 3.8 MMOL/L — SIGNIFICANT CHANGE UP (ref 3.5–5.3)
POTASSIUM SERPL-SCNC: 3.8 MMOL/L — SIGNIFICANT CHANGE UP (ref 3.5–5.3)
RBC # BLD: 3.12 M/UL — LOW (ref 3.8–5.2)
RBC # FLD: 15.6 % — HIGH (ref 10.3–14.5)
SODIUM SERPL-SCNC: 139 MMOL/L — SIGNIFICANT CHANGE UP (ref 135–145)
WBC # BLD: 8.84 K/UL — SIGNIFICANT CHANGE UP (ref 3.8–10.5)
WBC # FLD AUTO: 8.84 K/UL — SIGNIFICANT CHANGE UP (ref 3.8–10.5)

## 2019-05-28 PROCEDURE — 99232 SBSQ HOSP IP/OBS MODERATE 35: CPT

## 2019-05-28 RX ORDER — TRAMADOL HYDROCHLORIDE 50 MG/1
25 TABLET ORAL EVERY 4 HOURS
Refills: 0 | Status: DISCONTINUED | OUTPATIENT
Start: 2019-05-29 | End: 2019-05-29

## 2019-05-28 RX ORDER — SENNA PLUS 8.6 MG/1
2 TABLET ORAL
Qty: 0 | Refills: 0 | DISCHARGE
Start: 2019-05-28

## 2019-05-28 RX ORDER — ACETAMINOPHEN 500 MG
2 TABLET ORAL
Qty: 0 | Refills: 0 | DISCHARGE
Start: 2019-05-28

## 2019-05-28 RX ORDER — DOCUSATE SODIUM 100 MG
1 CAPSULE ORAL
Qty: 0 | Refills: 0 | DISCHARGE
Start: 2019-05-28

## 2019-05-28 RX ORDER — APIXABAN 2.5 MG/1
1 TABLET, FILM COATED ORAL
Qty: 60 | Refills: 0
Start: 2019-05-28 | End: 2019-06-26

## 2019-05-28 RX ORDER — TRAMADOL HYDROCHLORIDE 50 MG/1
50 TABLET ORAL EVERY 4 HOURS
Refills: 0 | Status: DISCONTINUED | OUTPATIENT
Start: 2019-05-29 | End: 2019-05-29

## 2019-05-28 RX ORDER — MAGNESIUM HYDROXIDE 400 MG/1
30 TABLET, CHEWABLE ORAL
Qty: 0 | Refills: 0 | DISCHARGE
Start: 2019-05-28

## 2019-05-28 RX ORDER — POLYETHYLENE GLYCOL 3350 17 G/17G
17 POWDER, FOR SOLUTION ORAL
Qty: 0 | Refills: 0 | DISCHARGE
Start: 2019-05-28

## 2019-05-28 RX ADMIN — APIXABAN 2.5 MILLIGRAM(S): 2.5 TABLET, FILM COATED ORAL at 06:09

## 2019-05-28 RX ADMIN — TRAMADOL HYDROCHLORIDE 25 MILLIGRAM(S): 50 TABLET ORAL at 11:29

## 2019-05-28 RX ADMIN — TRAMADOL HYDROCHLORIDE 25 MILLIGRAM(S): 50 TABLET ORAL at 12:00

## 2019-05-28 RX ADMIN — Medication 100 MILLIGRAM(S): at 17:57

## 2019-05-28 RX ADMIN — APIXABAN 2.5 MILLIGRAM(S): 2.5 TABLET, FILM COATED ORAL at 17:58

## 2019-05-28 RX ADMIN — Medication 3 MILLIGRAM(S): at 21:30

## 2019-05-28 NOTE — PROGRESS NOTE ADULT - SUBJECTIVE AND OBJECTIVE BOX
Ortho Note    Pt comfortable without complaints, pain controlled  Denies CP, SOB, N/V, numbness/tingling     Vital Signs Last 24 Hrs  T(C): 36.8 (28 May 2019 08:18), Max: 37.3 (27 May 2019 20:11)  T(F): 98.2 (28 May 2019 08:18), Max: 99.2 (27 May 2019 20:11)  HR: 66 (28 May 2019 08:18) (66 - 81)  BP: 102/68 (28 May 2019 08:18) (96/65 - 116/74)  BP(mean): --  RR: 17 (28 May 2019 08:18) (16 - 17)  SpO2: 99% (28 May 2019 08:18) (96% - 99%)      VSS  General: A&Ox3, NAD  RLE: Ace / Aquacell DSG C/D/I; Betsy brace in place  Pulses: Foot WWP; DP pulse 2+; Cap refill < 2 sec  Sensation: SILT distally and symmetric to contralateral extremity  Motor: TA/EHL/FHL/GS 5/5 and symmetric to contralateral extremity      no labs    A/P: 73yFemale POD8 s/p R retrograde femur IMN for periprosthetic femur fx  - Stable  - Pain/Nausea Control adequate  - Home meds  - Hgb has stabilized, today's AM labs pending -- f/u Hgb  - DVT ppx: Eliquis 2.5 BID  - WBS: PWB in [place]  - PT: recommending TOI  - Plan for likely d/c to airport Wednesday

## 2019-05-28 NOTE — PROGRESS NOTE ADULT - ATTENDING COMMENTS
Patient seen examined. Patient made aware that her case postponed this morning due to emergent case bumping.   Patient examined at beside. Resting comfortably.   Right leg:   KI in place  NVI  NO fracture blisters  No open wounds    A/P OR today for ORIF vs Retrograde nailing, with poly exchange. Patient counseled that the components do not appear loose, but if they do, then she will need a long stem revision TKA and I will involve my reconstruction partner, Dr. Kyle Zavala.
I evaluated the patient in the evening at 8 PM. She is doing well. I changed her dressings to all clear Tegaderms. Her staples are intact. There is mild erythema about the proximal lateral incision without any evidence of infection. It looks more to be reactive to her staple line. She is otherwise doing very well. Her hinged knee brace was adjusted to appropriately fit her right thigh girth.     We talked extensively about the transition and transported tomorrow being a significant burden. She is aware and has assistance arranged for her to go to the airport tomorrow. I have coordinated with Dr. Segura and the patient will be seen on Friday, May 31 with him and then subsequently with her primary care doctor.
Patient seen examined and history reviewed with patient, family, and resident in morning of Saturday May 18th. Subsequently, I discussed the patients care with the patients primary joints orthopedic surgeon, Dr. Isaac Segura at [facility] ([place]). We discussed the patients plan for retrograde intramedullary nailing and the size of the components were confirmed to ensure appropriate instrumentation available for the case. The patient has a stable appearing  right total knee arthroplasty with an LPS nexgen Breann total knee with associated distal femur periarticular fracture. She has been cleared by medicine. I explained at length to the patient and the family the risks, benefits and alternatives to operative treatment. Depending on the stability of the construct and her bone quality will determine her weight bearing post operatively. In addition, we discussed her eventual transition back to [place] and need for anticoagulation. She will likely be placed on eliquis 2.5mg BID for the trip home. She will be following up with Dr. Segura which we will continue to coordinate post operatively.   UPDATE:  We are awaiting the appropriate sized retrograde femoral nail and polys for exchange during the procedure,   Dr. Segura requested a poly exchange due to concern for debris during retrograde nail placement which I believe to be appropriate. In order to do so, the patient will require a formal arthrotomy. We plan to utilize her previous total knee incision. The case will be moved to 5/19 while implants and equipment arrive and are processed and sterilized. Patient explained this and is in agreement.
Patient seen examined. Patient made aware that her case postponed this morning due to emergent case bumping.   Patient examined at beside. Resting comfortably.   Right leg:   KI in place  NVI  NO fracture blisters  No open wounds    A/P OR today for ORIF vs Retrograde nailing, with poly exchange. Patient counseled that the components do not appear loose, but if they do, then she will need a long stem revision TKA and I will involve my reconstruction partner, Dr. Kyle Zavala.
The patient continues to improve. She was able to ambulate to the nurse's station with physical therapy. Her hemoglobin has stabilized. She has no evidence of orthostatic hypotension. She continues to progress appropriately. We continue to await insurance authorization/confirmation for transport back to [place]. The patient currently has a flight scheduled on May 29. The patient's care would be significantly impaired by disposition to a rehabilitation center in [facility] in attempt to coordinate her transport home. My office as reached out on multiple occasions to the insurance carrier and sent in all relevant documentation on May 24. I was not contacted back by the medical director after leaving a message on Thursday, May 23. The insurance carrier was made aware of this. I still have not received a phone call from the medical director of her insurance plan.
I discussed the patient's care with the physician's assistant. I also spoke to the patient correctly. She is doing well. She was able to make it to the door with physical therapy and partial weightbearing. We will continue to see her progress. Her discharge is pending coordination for her transportation back to [place] if she is able to clear physical therapy.
I saw the patient this morning at 7 AM. She is doing well. She continues to progress appropriately. She is no evidence of orthostatic hypotension. She continues to improve and her pain continues to improve. I had a lengthy conversation with her about coordinating her discharge from the hospital. There is a significant concern for fall risk if she is discharged to home without 24 7 assistants. In order to optimize the patient for discharge, I believe that it is best that the patient either be discharged to subacute rehabilitation with a direct planned for her being transported to the airport on Wednesday, May 29, or discharged directly from the hospital on May 29.
I spoke to the patient directly on the phone. She is doing well. She is pleased with her care. She is progressing appropriately. We are awaiting insurance authorization for disposition back to [place]. The patient will likely require and ambulate or transport as well as assistance at the airport on 29 May. I spoke with the patient's primary joint arthroplasty surgeon, Dr. Segura. He was sent images of the patient's pre-operative/injury films. He was also sent post operative films. We discussed her transition her weight bearing status and range of motion. The patient is plan to see him on Friday, May 31 at [facility] in [place]

## 2019-05-28 NOTE — PROGRESS NOTE ADULT - ASSESSMENT
73F PMH HTN, h/o gastric bypass surgery 15 yrs ago, b/l knee replacement, presenting after a fall, admitted for R distal femur periprosthetic fx repair. Medicine consulted for medical clearance. Now POD-9 s/p R retrograde femoral nail, poly exchange.    1) Post-op anemia, now improving.   HD stable, currently asymptomatic.   No need for transfusion at present time.  * Transfuse for Hb>7g    2) Post-op state  * OOB-C  * Physical therapy evaluation  * Aggressive incentive spirometry  * Pain control and bowel regimen  * Mechanical LE ppx     3) HTN, controlled.   off bp meds.     4) VTE ppx.   * C/w Eliquis 2.5 bid --please continue with Eliquis on discharge; complete 1 month.  * SCDs    Medically optimized for discharge

## 2019-05-28 NOTE — PROGRESS NOTE ADULT - SUBJECTIVE AND OBJECTIVE BOX
Ortho Note    Pt comfortable without complaints, pain controlled.  Denies CP, SOB, N/V, numbness/tingling down le b/l    Vital Signs Last 24 Hrs  T(C): 36.8 (05-28-19 @ 08:18), Max: 37 (05-28-19 @ 06:06)  T(F): 98.2 (05-28-19 @ 08:18), Max: 98.6 (05-28-19 @ 06:06)  HR: 66 (05-28-19 @ 08:18) (66 - 68)  BP: 102/68 (05-28-19 @ 08:18) (102/68 - 111/70)  BP(mean): --  RR: 17 (05-28-19 @ 08:18) (17 - 17)  SpO2: 99% (05-28-19 @ 08:18) (98% - 99%)      General: Pt Alert and oriented, NAD  DSG aquacel RLE with hinged knee brace in place C/D/I  Pulses:  DP's +2 b/l brisk cap refill  Sensation:  SILT throughout le b/l and symmetrically   Motor: EHL/FHL/TA/GS 5/5 b/l                          8.4    8.84  )-----------( 495      ( 28 May 2019 10:46 )             27.8   28 May 2019 10:46    139    |  104    |  17     ----------------------------<  116    3.8     |  26     |  0.66     Ca    9.0        28 May 2019 10:46        A/P: 73yFemale POD#9 s/p R femur retrograde IMN  - Stable  - Pain Control as needed  - DVT ppx:  eliquis  - PT, WBS: PWB in hinged knee brace  - Dispo dc to airport tomorrow 5/29/2019    Ortho Pager [phone]

## 2019-05-28 NOTE — PROGRESS NOTE ADULT - SUBJECTIVE AND OBJECTIVE BOX
CC: No overnight events, no complaints.   Feeling well, pain is overall controlled.  Tolerates PO diet (+); Urination (+); Walked with PT (+)  Denies cp, sob, dizziness, HA, abdominal pain, n/v.  Rest of ROS negative.     Vital Signs Last 24 Hrs  T(C): 36.8 (28 May 2019 08:18), Max: 37.3 (27 May 2019 20:11)  T(F): 98.2 (28 May 2019 08:18), Max: 99.2 (27 May 2019 20:11)  HR: 66 (28 May 2019 08:18) (66 - 81)  BP: 102/68 (28 May 2019 08:18) (96/65 - 116/74)  BP(mean): --  RR: 17 (28 May 2019 08:18) (16 - 17)  SpO2: 99% (28 May 2019 08:18) (96% - 99%)    PHYSICAL EXAMINATION  * General: Not in acute distress. Awake and alert. Lying comfortably in bed.  * Head: Normocephalic, atraumatic.  * HEENT: ears no discharge, eyes PERRLA, nose no discharge, throat no exudates, normal tonsils.  * Neck: no JVD, supple.  * Lungs: Clear to auscultation, no rales, no wheezes.  * Cardio: Regular rate and rhythm, no murmurs, no rubs, no gallops. Good peripheral pulses.  * Abdomen: Soft, non-tender, non-distended, tympanic to percussion, no rebound, no guarding, no rigidity. Bowel sounds present. No suprapubic or CVA tenderness.  * : Osullivan  * Extremities: Acyanotic, no edema.  * Skin: Warm and dry.  * Neuro: Alert and oriented x 3. No focal deficits. Motor strength is 5/5 throughout. Sensation intact. Cranial nerves II-XII grossly intact.                              8.4    8.84  )-----------( 495      ( 28 May 2019 10:46 )             27.8       05-28    139  |  104  |  17  ----------------------------<  116<H>  3.8   |  26  |  0.66    Ca    9.0      28 May 2019 10:46    MEDICATIONS  (STANDING):  apixaban 2.5 milliGRAM(s) Oral every 12 hours  BUpivacaine liposome 1.3% Injectable (no eMAR) 20 milliLiter(s) Local Injection once  docusate sodium 100 milliGRAM(s) Oral three times a day  lactated ringers. 1000 milliLiter(s) (100 mL/Hr) IV Continuous <Continuous>  melatonin 3 milliGRAM(s) Oral at bedtime  polyethylene glycol 3350 17 Gram(s) Oral daily    MEDICATIONS  (PRN):  acetaminophen   Tablet .. 650 milliGRAM(s) Oral every 4 hours PRN Temp greater or equal to 38C (100.4F), Mild Pain (1 - 3)  aluminum hydroxide/magnesium hydroxide/simethicone Suspension 30 milliLiter(s) Oral four times a day PRN Indigestion  benzocaine 15 mG/menthol 3.6 mG Lozenge 1 Lozenge Oral every 4 hours PRN Sore Throat  bisacodyl Suppository 10 milliGRAM(s) Rectal daily PRN If no bowel movement by postoperative day #2  magnesium hydroxide Suspension 30 milliLiter(s) Oral daily PRN Constipation  ondansetron Injectable 4 milliGRAM(s) IV Push every 6 hours PRN Nausea and/or Vomiting  senna 2 Tablet(s) Oral at bedtime PRN Constipation  traMADol 25 milliGRAM(s) Oral every 4 hours PRN Moderate Pain (4 - 6)  traMADol 50 milliGRAM(s) Oral every 4 hours PRN Severe Pain (7 - 10)

## 2019-05-28 NOTE — CHART NOTE - NSCHARTNOTEFT_GEN_A_CORE
Admitting Diagnosis:   Patient is a 73y old  Female who presents with a chief complaint of Right distal femur periprosthetic fracture (28 May 2019 12:41)      PAST MEDICAL & SURGICAL HISTORY:  Mild hypertension  S/P total knee arthroplasty, bilateral      Current Nutrition Order:  Regular        PO Intake: Good (%) [   ]  Fair (50-75%) [  X ] Poor (<25%) [   ]    GI Issues: No N/V/C/D reported at this time. +BM 5/27    Pain: No pain endorsed at time of visit     Skin Integrity: Rod 20; RLE surgical wound     Labs:   05-28    139  |  104  |  17  ----------------------------<  116<H>  3.8   |  26  |  0.66    Ca    9.0      28 May 2019 10:46      CAPILLARY BLOOD GLUCOSE          Medications:  MEDICATIONS  (STANDING):  apixaban 2.5 milliGRAM(s) Oral every 12 hours  BUpivacaine liposome 1.3% Injectable (no eMAR) 20 milliLiter(s) Local Injection once  docusate sodium 100 milliGRAM(s) Oral three times a day  lactated ringers. 1000 milliLiter(s) (100 mL/Hr) IV Continuous <Continuous>  melatonin 3 milliGRAM(s) Oral at bedtime  polyethylene glycol 3350 17 Gram(s) Oral daily    MEDICATIONS  (PRN):  acetaminophen   Tablet .. 650 milliGRAM(s) Oral every 4 hours PRN Temp greater or equal to 38C (100.4F), Mild Pain (1 - 3)  aluminum hydroxide/magnesium hydroxide/simethicone Suspension 30 milliLiter(s) Oral four times a day PRN Indigestion  benzocaine 15 mG/menthol 3.6 mG Lozenge 1 Lozenge Oral every 4 hours PRN Sore Throat  bisacodyl Suppository 10 milliGRAM(s) Rectal daily PRN If no bowel movement by postoperative day #2  magnesium hydroxide Suspension 30 milliLiter(s) Oral daily PRN Constipation  ondansetron Injectable 4 milliGRAM(s) IV Push every 6 hours PRN Nausea and/or Vomiting  senna 2 Tablet(s) Oral at bedtime PRN Constipation  traMADol 25 milliGRAM(s) Oral every 4 hours PRN Moderate Pain (4 - 6)  traMADol 50 milliGRAM(s) Oral every 4 hours PRN Severe Pain (7 - 10)      Weight: 95.3kg   Daily     Daily     Weight Change: No new weights recorded since admit     Nutrition Focused Physical Exam: Completed [   ]  Not Pertinent [ x  ]    Estimated energy needs: Ideal body weight (49.8kg) used for calculations as pt >120% of IBW. Needs estimated for maintenance in older adults; increased protein for post-op/wound healing  Calories: 20-25 kcal/kg = 996-1245 kcal/day  Protein: 1.2-1.4 g/kg = 60-70g protein/day  Fluids: 30-35 mL/kg = 9902-1331 mL/day     Subjective: 72 yo/female with PMHx B/L TKA, HTN, presented s/p mechanical fall onto R. knee. Found to have R. distal femur periprosthetic fracture. S/p R. retrograde femoral IMN on 5/19. Pt planned to return to [place] tomorrow, 5/29. Pt seen in room, awake, alert, very pleasant. She endorses good appetite and intake. No complaints of N/V/C/D or pain at this time. Last BM 5/28. Pt is worried about having to use the restroom while on airplane; suggested holding stool softeners today/tomorrow, as she is having regular BMs at this point. Encouraged pt upon return home to focus on fiber-rich and high protein foods and fluids.     Previous Nutrition Diagnosis:  Increased nutrient needs RT increased demand for protein intake AEB post-op/wound healing    Active [ X  ]  Resolved [   ]    If resolved, new PES:     Goal: Pt will continue to meet % of EER consistently     Recommendations:  1. Encourage PO intake   2. Monitor lytes and replete prn.   3. Pain and bowel regimens per team     Education: Encouraged PO intake; protein/fluids     Risk Level: High [   ] Moderate [ X  ] Low [   ]

## 2019-05-29 VITALS
DIASTOLIC BLOOD PRESSURE: 70 MMHG | RESPIRATION RATE: 17 BRPM | TEMPERATURE: 99 F | HEART RATE: 78 BPM | SYSTOLIC BLOOD PRESSURE: 103 MMHG | OXYGEN SATURATION: 98 %

## 2019-05-29 LAB
ANION GAP SERPL CALC-SCNC: 11 MMOL/L — SIGNIFICANT CHANGE UP (ref 5–17)
BUN SERPL-MCNC: 15 MG/DL — SIGNIFICANT CHANGE UP (ref 7–23)
CALCIUM SERPL-MCNC: 9.4 MG/DL — SIGNIFICANT CHANGE UP (ref 8.4–10.5)
CHLORIDE SERPL-SCNC: 103 MMOL/L — SIGNIFICANT CHANGE UP (ref 96–108)
CO2 SERPL-SCNC: 28 MMOL/L — SIGNIFICANT CHANGE UP (ref 22–31)
CREAT SERPL-MCNC: 0.69 MG/DL — SIGNIFICANT CHANGE UP (ref 0.5–1.3)
GLUCOSE SERPL-MCNC: 108 MG/DL — HIGH (ref 70–99)
HCT VFR BLD CALC: 29.3 % — LOW (ref 34.5–45)
HGB BLD-MCNC: 8.8 G/DL — LOW (ref 11.5–15.5)
MCHC RBC-ENTMCNC: 26.6 PG — LOW (ref 27–34)
MCHC RBC-ENTMCNC: 30 GM/DL — LOW (ref 32–36)
MCV RBC AUTO: 88.5 FL — SIGNIFICANT CHANGE UP (ref 80–100)
NRBC # BLD: 0 /100 WBCS — SIGNIFICANT CHANGE UP (ref 0–0)
PLATELET # BLD AUTO: 517 K/UL — HIGH (ref 150–400)
POTASSIUM SERPL-MCNC: 4.8 MMOL/L — SIGNIFICANT CHANGE UP (ref 3.5–5.3)
POTASSIUM SERPL-SCNC: 4.8 MMOL/L — SIGNIFICANT CHANGE UP (ref 3.5–5.3)
RBC # BLD: 3.31 M/UL — LOW (ref 3.8–5.2)
RBC # FLD: 15.8 % — HIGH (ref 10.3–14.5)
SODIUM SERPL-SCNC: 142 MMOL/L — SIGNIFICANT CHANGE UP (ref 135–145)
WBC # BLD: 8.75 K/UL — SIGNIFICANT CHANGE UP (ref 3.8–10.5)
WBC # FLD AUTO: 8.75 K/UL — SIGNIFICANT CHANGE UP (ref 3.8–10.5)

## 2019-05-29 PROCEDURE — 86850 RBC ANTIBODY SCREEN: CPT

## 2019-05-29 PROCEDURE — 97116 GAIT TRAINING THERAPY: CPT

## 2019-05-29 PROCEDURE — 96374 THER/PROPH/DIAG INJ IV PUSH: CPT | Mod: XU

## 2019-05-29 PROCEDURE — 86923 COMPATIBILITY TEST ELECTRIC: CPT

## 2019-05-29 PROCEDURE — 85610 PROTHROMBIN TIME: CPT

## 2019-05-29 PROCEDURE — 73552 X-RAY EXAM OF FEMUR 2/>: CPT

## 2019-05-29 PROCEDURE — P9016: CPT

## 2019-05-29 PROCEDURE — 82652 VIT D 1 25-DIHYDROXY: CPT

## 2019-05-29 PROCEDURE — 99285 EMERGENCY DEPT VISIT HI MDM: CPT | Mod: 25

## 2019-05-29 PROCEDURE — 86900 BLOOD TYPING SEROLOGIC ABO: CPT

## 2019-05-29 PROCEDURE — 81003 URINALYSIS AUTO W/O SCOPE: CPT

## 2019-05-29 PROCEDURE — 73551 X-RAY EXAM OF FEMUR 1: CPT

## 2019-05-29 PROCEDURE — 85027 COMPLETE CBC AUTOMATED: CPT

## 2019-05-29 PROCEDURE — 86803 HEPATITIS C AB TEST: CPT

## 2019-05-29 PROCEDURE — C1776: CPT

## 2019-05-29 PROCEDURE — 36430 TRANSFUSION BLD/BLD COMPNT: CPT

## 2019-05-29 PROCEDURE — 86901 BLOOD TYPING SEROLOGIC RH(D): CPT

## 2019-05-29 PROCEDURE — C1769: CPT

## 2019-05-29 PROCEDURE — 76000 FLUOROSCOPY <1 HR PHYS/QHP: CPT

## 2019-05-29 PROCEDURE — 85730 THROMBOPLASTIN TIME PARTIAL: CPT

## 2019-05-29 PROCEDURE — 99232 SBSQ HOSP IP/OBS MODERATE 35: CPT

## 2019-05-29 PROCEDURE — 97161 PT EVAL LOW COMPLEX 20 MIN: CPT

## 2019-05-29 PROCEDURE — 36415 COLL VENOUS BLD VENIPUNCTURE: CPT

## 2019-05-29 PROCEDURE — 87641 MR-STAPH DNA AMP PROBE: CPT

## 2019-05-29 PROCEDURE — 93005 ELECTROCARDIOGRAM TRACING: CPT | Mod: XU

## 2019-05-29 PROCEDURE — 80048 BASIC METABOLIC PNL TOTAL CA: CPT

## 2019-05-29 PROCEDURE — C1713: CPT

## 2019-05-29 PROCEDURE — 87086 URINE CULTURE/COLONY COUNT: CPT

## 2019-05-29 PROCEDURE — 97530 THERAPEUTIC ACTIVITIES: CPT

## 2019-05-29 PROCEDURE — 73700 CT LOWER EXTREMITY W/O DYE: CPT

## 2019-05-29 PROCEDURE — 71045 X-RAY EXAM CHEST 1 VIEW: CPT

## 2019-05-29 PROCEDURE — 80053 COMPREHEN METABOLIC PANEL: CPT

## 2019-05-29 PROCEDURE — 51702 INSERT TEMP BLADDER CATH: CPT

## 2019-05-29 PROCEDURE — 73560 X-RAY EXAM OF KNEE 1 OR 2: CPT

## 2019-05-29 RX ORDER — TRAMADOL HYDROCHLORIDE 50 MG/1
1 TABLET ORAL
Qty: 42 | Refills: 0
Start: 2019-05-29 | End: 2019-06-04

## 2019-05-29 RX ORDER — APIXABAN 2.5 MG/1
1 TABLET, FILM COATED ORAL
Qty: 60 | Refills: 0
Start: 2019-05-29 | End: 2019-06-27

## 2019-05-29 RX ADMIN — APIXABAN 2.5 MILLIGRAM(S): 2.5 TABLET, FILM COATED ORAL at 05:28

## 2019-05-29 RX ADMIN — TRAMADOL HYDROCHLORIDE 25 MILLIGRAM(S): 50 TABLET ORAL at 07:11

## 2019-05-29 NOTE — PROGRESS NOTE ADULT - REASON FOR ADMISSION
Right distal femur periprosthetic fracture

## 2019-05-29 NOTE — DISCHARGE NOTE NURSING/CASE MANAGEMENT/SOCIAL WORK - NSDCDPATPORTLINK_GEN_ALL_CORE
You can access the [facility] Patient Portal, offered by [facility], by registering with the following website: [email or web address]

## 2019-05-29 NOTE — PROGRESS NOTE ADULT - NSHPATTENDINGPLANDISCUSS_GEN_ALL_CORE
Dr. Bennett
Ortho
Ortho PA
The patient and Dr. Allison (resident). I agree with the above findings and examination. The patient no longer wires any further trending of her labs. She has stabilized her hemoglobin.
The resident team

## 2019-05-29 NOTE — PROGRESS NOTE ADULT - SUBJECTIVE AND OBJECTIVE BOX
CC: No overnight events, no complaints.   Feeling well, pain is overall controlled.  Tolerates PO diet (+); Urination (+); Walked with PT (+)  Denies cp, sob, dizziness, HA, abdominal pain, n/v.  Rest of ROS negative.     Vital Signs Last 24 Hrs  T(C): 37 (29 May 2019 08:36), Max: 37 (29 May 2019 08:36)  T(F): 98.6 (29 May 2019 08:36), Max: 98.6 (29 May 2019 08:36)  HR: 78 (29 May 2019 08:36) (71 - 78)  BP: 103/70 (29 May 2019 08:36) (103/70 - 111/68)  BP(mean): --  RR: 17 (29 May 2019 08:36) (16 - 18)  SpO2: 98% (29 May 2019 08:36) (98% - 99%)    PHYSICAL EXAMINATION  * General: Not in acute distress. Awake and alert. Lying comfortably in bed.  * Head: Normocephalic, atraumatic.  * HEENT: ears no discharge, eyes PERRLA, nose no discharge, throat no exudates, normal tonsils.  * Neck: no JVD, supple.  * Lungs: Clear to auscultation, no rales, no wheezes.  * Cardio: Regular rate and rhythm, no murmurs, no rubs, no gallops. Good peripheral pulses.  * Abdomen: Soft, non-tender, non-distended, tympanic to percussion, no rebound, no guarding, no rigidity. Bowel sounds present. No suprapubic or CVA tenderness.  * : Osullivan  * Extremities: Acyanotic, no edema.  * Skin: Warm and dry.  * Neuro: Alert and oriented x 3. No focal deficits. Motor strength is 5/5 throughout. Sensation intact. Cranial nerves II-XII grossly intact.                              8.8    8.75  )-----------( 517      ( 29 May 2019 06:58 )             29.3   05-29    142  |  103  |  15  ----------------------------<  108<H>  4.8   |  28  |  0.69    Ca    9.4      29 May 2019 06:58    MEDICATIONS  (STANDING):  apixaban 2.5 milliGRAM(s) Oral every 12 hours  BUpivacaine liposome 1.3% Injectable (no eMAR) 20 milliLiter(s) Local Injection once  docusate sodium 100 milliGRAM(s) Oral three times a day  lactated ringers. 1000 milliLiter(s) (100 mL/Hr) IV Continuous <Continuous>  melatonin 3 milliGRAM(s) Oral at bedtime  polyethylene glycol 3350 17 Gram(s) Oral daily    MEDICATIONS  (PRN):  acetaminophen   Tablet .. 650 milliGRAM(s) Oral every 4 hours PRN Temp greater or equal to 38C (100.4F), Mild Pain (1 - 3)  aluminum hydroxide/magnesium hydroxide/simethicone Suspension 30 milliLiter(s) Oral four times a day PRN Indigestion  benzocaine 15 mG/menthol 3.6 mG Lozenge 1 Lozenge Oral every 4 hours PRN Sore Throat  bisacodyl Suppository 10 milliGRAM(s) Rectal daily PRN If no bowel movement by postoperative day #2  magnesium hydroxide Suspension 30 milliLiter(s) Oral daily PRN Constipation  ondansetron Injectable 4 milliGRAM(s) IV Push every 6 hours PRN Nausea and/or Vomiting  senna 2 Tablet(s) Oral at bedtime PRN Constipation  traMADol 25 milliGRAM(s) Oral every 4 hours PRN Moderate Pain (4 - 6)  traMADol 50 milliGRAM(s) Oral every 4 hours PRN Severe Pain (7 - 10)

## 2019-05-29 NOTE — PROGRESS NOTE ADULT - ASSESSMENT
73F PMH HTN, h/o gastric bypass surgery 15 yrs ago, b/l knee replacement, presenting after a fall, admitted for R distal femur periprosthetic fx repair. Medicine consulted for medical clearance. Now POD-10 s/p R retrograde femoral nail, poly exchange.    1) Post-op anemia, now improving.   HD stable, currently asymptomatic.   No need for transfusion at present time.  * Transfuse for Hb>7g    2) Post-op state  * OOB-C  * Physical therapy evaluation  * Aggressive incentive spirometry  * Pain control and bowel regimen  * Mechanical LE ppx     3) HTN, controlled.   off bp meds.     4) VTE ppx.   * C/w Eliquis 2.5 bid --please continue with Eliquis on discharge; complete 1 month.  * SCDs    Medically optimized for discharge

## 2019-05-29 NOTE — PROGRESS NOTE ADULT - SUBJECTIVE AND OBJECTIVE BOX
Ortho Note    Pt comfortable without complaints, pain controlled  Denies CP, SOB, N/V, numbness/tingling     AVSS  General: A&Ox3, NAD  RLE: Ace / Aquacell DSG C/D/I; Kemp brace in place  Pulses: Foot WWP; DP pulse 2+; Cap refill < 2 sec  Sensation: SILT distally and symmetric to contralateral extremity  Motor: TA/EHL/FHL/GS 5/5 and symmetric to contralateral extremity      no labs    A/P: 73yFemale POD8 s/p R retrograde femur IMN for periprosthetic femur fx  - Stable  - Pain/Nausea Control adequate  - Home meds  - DVT ppx: Eliquis 2.5 BID  - WBS: PWB in [place]  - PT: recommending TOI  - Plan for likely d/c to airport today

## 2019-06-03 DIAGNOSIS — S72.444A: ICD-10-CM
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
## 2019-06-03 DIAGNOSIS — E66.9 OBESITY, UNSPECIFIED: ICD-10-CM

## 2019-06-03 DIAGNOSIS — D62 ACUTE POSTHEMORRHAGIC ANEMIA: ICD-10-CM

## 2019-06-03 DIAGNOSIS — M81.0 AGE-RELATED OSTEOPOROSIS WITHOUT CURRENT PATHOLOGICAL FRACTURE: ICD-10-CM

## 2019-06-03 DIAGNOSIS — M97.11XA PERIPROSTHETIC FRACTURE AROUND INTERNAL PROSTHETIC RIGHT KNEE JOINT, INITIAL ENCOUNTER: ICD-10-CM

## 2020-01-19 NOTE — PROGRESS NOTE ADULT - SUBJECTIVE AND OBJECTIVE BOX
CC: No overnight events, no complaints.   Feeling well, pain is overall controlled.  Tolerates PO diet (+); Urination (+); Walked with PT (+)  Denies cp, sob, dizziness, HA, abdominal pain, n/v.  Rest of ROS negative.     Vital Signs Last 24 Hrs  T(C): 36.7 (25 May 2019 08:43), Max: 37.8 (24 May 2019 22:06)  T(F): 98 (25 May 2019 08:43), Max: 100.1 (24 May 2019 22:06)  HR: 68 (25 May 2019 08:43) (68 - 78)  BP: 111/58 (25 May 2019 08:43) (95/59 - 116/71)  BP(mean): --  RR: 16 (25 May 2019 08:43) (15 - 16)  SpO2: 96% (25 May 2019 08:43) (95% - 100%)    PHYSICAL EXAMINATION  * General: Not in acute distress. Awake and alert. Lying comfortably in bed.  * Head: Normocephalic, atraumatic.  * HEENT: ears no discharge, eyes PERRLA, nose no discharge, throat no exudates, normal tonsils.  * Neck: no JVD, supple.  * Lungs: Clear to auscultation, no rales, no wheezes.  * Cardio: Regular rate and rhythm, no murmurs, no rubs, no gallops. Good peripheral pulses.  * Abdomen: Soft, non-tender, non-distended, tympanic to percussion, no rebound, no guarding, no rigidity. Bowel sounds present. No suprapubic or CVA tenderness.  * : [place]  * Extremities: Acyanotic, no edema.  * Skin: Warm and dry.  * Neuro: Alert and oriented x 3. No focal deficits. Motor strength is 5/5 throughout. Sensation intact. Cranial nerves II-XII grossly intact.                              8.4    8.65  )-----------( 402      ( 25 May 2019 10:38 )             27.7   05-25    141  |  102  |  13  ----------------------------<  127<H>  4.1   |  27  |  0.60    Ca    9.1      25 May 2019 10:38    MEDICATIONS  (STANDING):  apixaban 2.5 milliGRAM(s) Oral every 12 hours  BUpivacaine liposome 1.3% Injectable (no eMAR) 20 milliLiter(s) Local Injection once  docusate sodium 100 milliGRAM(s) Oral three times a day  lactated ringers. 1000 milliLiter(s) (100 mL/Hr) IV Continuous <Continuous>  melatonin 3 milliGRAM(s) Oral at bedtime  polyethylene glycol 3350 17 Gram(s) Oral daily    MEDICATIONS  (PRN):  acetaminophen   Tablet .. 650 milliGRAM(s) Oral every 4 hours PRN Temp greater or equal to 38C (100.4F), Mild Pain (1 - 3)  aluminum hydroxide/magnesium hydroxide/simethicone Suspension 30 milliLiter(s) Oral four times a day PRN Indigestion  benzocaine 15 mG/menthol 3.6 mG Lozenge 1 Lozenge Oral every 4 hours PRN Sore Throat  bisacodyl Suppository 10 milliGRAM(s) Rectal daily PRN If no bowel movement by postoperative day #2  magnesium hydroxide Suspension 30 milliLiter(s) Oral daily PRN Constipation  morphine  - Injectable 4 milliGRAM(s) IV Push every 4 hours PRN breakthrough floor  ondansetron Injectable 4 milliGRAM(s) IV Push every 6 hours PRN Nausea and/or Vomiting  senna 2 Tablet(s) Oral at bedtime PRN Constipation  traMADol 25 milliGRAM(s) Oral every 4 hours PRN Moderate Pain (4 - 6)  traMADol 50 milliGRAM(s) Oral every 4 hours PRN Severe Pain (7 - 10) No

## 2021-12-09 NOTE — PHYSICAL THERAPY INITIAL EVALUATION ADULT - LEVEL OF INDEPENDENCE: GAIT, REHAB EVAL
Patient taking:  Flonase   Zyrtec  VItamin D.   No tylenol.   Did have some glasses of wine. But she does not drink often. Maybe a couple of times since May.     unable to perform

## 2023-02-07 NOTE — PATIENT PROFILE ADULT - NSPROPTRIGHTBILLOFRIGHTS_GEN_A_NUR
February 7, 2023        Christiano Hurst MD  [address]  [facility] [address]             [facility]  [address]  [facility] [address]  Phone: [phone]   Patient: Kelly Thomason   MR Number: [id number]   YOB: 1978   Date of Visit: 2/7/2023       Dear Dr. Hurst:    Thank you for referring Kelly Thomason to me for evaluation. Attached you will find relevant portions of my assessment and plan of care.    If you have questions, please do not hesitate to call me. I look forward to following Kelly Thomason along with you.    Sincerely,      Eliecer Gong MD            CC  No Recipients    Enclosure          patient